# Patient Record
Sex: FEMALE | Race: BLACK OR AFRICAN AMERICAN | NOT HISPANIC OR LATINO | URBAN - METROPOLITAN AREA
[De-identification: names, ages, dates, MRNs, and addresses within clinical notes are randomized per-mention and may not be internally consistent; named-entity substitution may affect disease eponyms.]

---

## 2018-11-28 ENCOUNTER — INPATIENT (INPATIENT)
Facility: HOSPITAL | Age: 58
LOS: 5 days | Discharge: HOME | End: 2018-12-04
Attending: HOSPITALIST | Admitting: HOSPITALIST
Payer: MEDICAID

## 2018-11-28 VITALS
TEMPERATURE: 98 F | OXYGEN SATURATION: 99 % | RESPIRATION RATE: 18 BRPM | HEART RATE: 75 BPM | SYSTOLIC BLOOD PRESSURE: 135 MMHG | DIASTOLIC BLOOD PRESSURE: 93 MMHG

## 2018-11-28 DIAGNOSIS — Z98.890 OTHER SPECIFIED POSTPROCEDURAL STATES: Chronic | ICD-10-CM

## 2018-11-28 DIAGNOSIS — D21.9 BENIGN NEOPLASM OF CONNECTIVE AND OTHER SOFT TISSUE, UNSPECIFIED: Chronic | ICD-10-CM

## 2018-11-28 LAB
ALBUMIN SERPL ELPH-MCNC: 4.1 G/DL — SIGNIFICANT CHANGE UP (ref 3.3–5)
ALP SERPL-CCNC: 78 U/L — SIGNIFICANT CHANGE UP (ref 40–120)
ALT FLD-CCNC: 6 U/L — SIGNIFICANT CHANGE UP (ref 4–33)
APPEARANCE UR: CLEAR — SIGNIFICANT CHANGE UP
AST SERPL-CCNC: 14 U/L — SIGNIFICANT CHANGE UP (ref 4–32)
BACTERIA # UR AUTO: NEGATIVE — SIGNIFICANT CHANGE UP
BASE EXCESS BLDV CALC-SCNC: 4.7 MMOL/L — SIGNIFICANT CHANGE UP
BILIRUB SERPL-MCNC: 0.4 MG/DL — SIGNIFICANT CHANGE UP (ref 0.2–1.2)
BILIRUB UR-MCNC: NEGATIVE — SIGNIFICANT CHANGE UP
BLOOD GAS VENOUS - CREATININE: 0.82 MG/DL — SIGNIFICANT CHANGE UP (ref 0.5–1.3)
BLOOD UR QL VISUAL: NEGATIVE — SIGNIFICANT CHANGE UP
BUN SERPL-MCNC: 13 MG/DL — SIGNIFICANT CHANGE UP (ref 7–23)
CALCIUM SERPL-MCNC: 10.1 MG/DL — SIGNIFICANT CHANGE UP (ref 8.4–10.5)
CHLORIDE BLDV-SCNC: 106 MMOL/L — SIGNIFICANT CHANGE UP (ref 96–108)
CHLORIDE SERPL-SCNC: 101 MMOL/L — SIGNIFICANT CHANGE UP (ref 98–107)
CO2 SERPL-SCNC: 25 MMOL/L — SIGNIFICANT CHANGE UP (ref 22–31)
COLOR SPEC: YELLOW — SIGNIFICANT CHANGE UP
CREAT SERPL-MCNC: 0.99 MG/DL — SIGNIFICANT CHANGE UP (ref 0.5–1.3)
GAS PNL BLDV: 140 MMOL/L — SIGNIFICANT CHANGE UP (ref 136–146)
GLUCOSE BLDV-MCNC: 97 — SIGNIFICANT CHANGE UP (ref 70–99)
GLUCOSE SERPL-MCNC: 103 MG/DL — HIGH (ref 70–99)
GLUCOSE UR-MCNC: NEGATIVE — SIGNIFICANT CHANGE UP
HCO3 BLDV-SCNC: 27 MMOL/L — SIGNIFICANT CHANGE UP (ref 20–27)
HCT VFR BLD CALC: 31.9 % — LOW (ref 34.5–45)
HCT VFR BLDV CALC: 31.2 % — LOW (ref 34.5–45)
HGB BLD-MCNC: 10.6 G/DL — LOW (ref 11.5–15.5)
HGB BLDV-MCNC: 10.1 G/DL — LOW (ref 11.5–15.5)
HYALINE CASTS # UR AUTO: HIGH
KETONES UR-MCNC: SIGNIFICANT CHANGE UP
LACTATE BLDV-MCNC: 1.3 MMOL/L — SIGNIFICANT CHANGE UP (ref 0.5–2)
LEUKOCYTE ESTERASE UR-ACNC: SIGNIFICANT CHANGE UP
LIDOCAIN IGE QN: 28.9 U/L — SIGNIFICANT CHANGE UP (ref 7–60)
MCHC RBC-ENTMCNC: 30.2 PG — SIGNIFICANT CHANGE UP (ref 27–34)
MCHC RBC-ENTMCNC: 33.2 % — SIGNIFICANT CHANGE UP (ref 32–36)
MCV RBC AUTO: 90.9 FL — SIGNIFICANT CHANGE UP (ref 80–100)
NITRITE UR-MCNC: NEGATIVE — SIGNIFICANT CHANGE UP
NRBC # FLD: 0 — SIGNIFICANT CHANGE UP
PCO2 BLDV: 52 MMHG — HIGH (ref 41–51)
PH BLDV: 7.37 PH — SIGNIFICANT CHANGE UP (ref 7.32–7.43)
PH UR: 6 — SIGNIFICANT CHANGE UP (ref 5–8)
PLATELET # BLD AUTO: 484 K/UL — HIGH (ref 150–400)
PMV BLD: 9.3 FL — SIGNIFICANT CHANGE UP (ref 7–13)
PO2 BLDV: 19 MMHG — LOW (ref 35–40)
POTASSIUM BLDV-SCNC: 3.3 MMOL/L — LOW (ref 3.4–4.5)
POTASSIUM SERPL-MCNC: 3.4 MMOL/L — LOW (ref 3.5–5.3)
POTASSIUM SERPL-SCNC: 3.4 MMOL/L — LOW (ref 3.5–5.3)
PROT SERPL-MCNC: 7.6 G/DL — SIGNIFICANT CHANGE UP (ref 6–8.3)
PROT UR-MCNC: 70 — SIGNIFICANT CHANGE UP
RBC # BLD: 3.51 M/UL — LOW (ref 3.8–5.2)
RBC # FLD: 13 % — SIGNIFICANT CHANGE UP (ref 10.3–14.5)
RBC CASTS # UR COMP ASSIST: HIGH (ref 0–?)
SAO2 % BLDV: 22.2 % — LOW (ref 60–85)
SODIUM SERPL-SCNC: 142 MMOL/L — SIGNIFICANT CHANGE UP (ref 135–145)
SP GR SPEC: 1.04 — SIGNIFICANT CHANGE UP (ref 1–1.04)
SQUAMOUS # UR AUTO: SIGNIFICANT CHANGE UP
UROBILINOGEN FLD QL: NORMAL — SIGNIFICANT CHANGE UP
WBC # BLD: 12.67 K/UL — HIGH (ref 3.8–10.5)
WBC # FLD AUTO: 12.67 K/UL — HIGH (ref 3.8–10.5)
WBC UR QL: HIGH (ref 0–?)

## 2018-11-28 PROCEDURE — 74177 CT ABD & PELVIS W/CONTRAST: CPT | Mod: 26

## 2018-11-28 RX ORDER — SODIUM CHLORIDE 9 MG/ML
1000 INJECTION INTRAMUSCULAR; INTRAVENOUS; SUBCUTANEOUS ONCE
Qty: 0 | Refills: 0 | Status: COMPLETED | OUTPATIENT
Start: 2018-11-28 | End: 2018-11-28

## 2018-11-28 RX ORDER — CIPROFLOXACIN LACTATE 400MG/40ML
400 VIAL (ML) INTRAVENOUS ONCE
Qty: 0 | Refills: 0 | Status: COMPLETED | OUTPATIENT
Start: 2018-11-28 | End: 2018-11-28

## 2018-11-28 RX ORDER — MORPHINE SULFATE 50 MG/1
2 CAPSULE, EXTENDED RELEASE ORAL ONCE
Qty: 0 | Refills: 0 | Status: DISCONTINUED | OUTPATIENT
Start: 2018-11-28 | End: 2018-11-28

## 2018-11-28 RX ORDER — ONDANSETRON 8 MG/1
4 TABLET, FILM COATED ORAL ONCE
Qty: 0 | Refills: 0 | Status: COMPLETED | OUTPATIENT
Start: 2018-11-28 | End: 2018-11-28

## 2018-11-28 RX ADMIN — MORPHINE SULFATE 2 MILLIGRAM(S): 50 CAPSULE, EXTENDED RELEASE ORAL at 23:25

## 2018-11-28 RX ADMIN — ONDANSETRON 4 MILLIGRAM(S): 8 TABLET, FILM COATED ORAL at 17:39

## 2018-11-28 RX ADMIN — SODIUM CHLORIDE 2000 MILLILITER(S): 9 INJECTION INTRAMUSCULAR; INTRAVENOUS; SUBCUTANEOUS at 17:39

## 2018-11-28 RX ADMIN — Medication 200 MILLIGRAM(S): at 23:42

## 2018-11-28 RX ADMIN — MORPHINE SULFATE 2 MILLIGRAM(S): 50 CAPSULE, EXTENDED RELEASE ORAL at 18:49

## 2018-11-28 RX ADMIN — MORPHINE SULFATE 2 MILLIGRAM(S): 50 CAPSULE, EXTENDED RELEASE ORAL at 17:38

## 2018-11-28 NOTE — ED PROVIDER NOTE - OBJECTIVE STATEMENT
C/o lower abd pains x ~4 months.  Patient is a difficult historian, very anxious, undressing in front of me.  Reports pains throughout her entire body since ~7/2018 when she was seen at [facility] and told she had a UTI for which she was prescribed bactrim.  Patient reports her sx never resolved.  Her predominant pain is in the lower abdomen, although she describes it as varying in location and worsening her chronic chest pains which she has daily.  Patient reports having vaginal bleeding intermittently over past month.  Previous to this had not had menstruation since age 48.  She also reports inability to tolerate PO secondary to vomiting over the past two weeks.  No diarrhea, no constipation.  +dysuria ("have to push it out").  No fevers/chills.  Patent reports only exacerbating factor is eating, and does have a h/o "gastritis".  Also reports itchy rash to right breast and left flank ~4 days ago.

## 2018-11-28 NOTE — ED ADULT TRIAGE NOTE - CHIEF COMPLAINT QUOTE
Patient has c/o abdominal and chest pain. Pt states she is unable to keep anything down. Pt brings papers from ultrasound and MD wants a repeat ultrasound

## 2018-11-28 NOTE — ED PROVIDER NOTE - MEDICAL DECISION MAKING DETAILS
multiple complaints, but predominant concern is chronic abd pain which appears to localize to right abdomen.  had recent negative sono from 2 weeks ago, which her PMD wants to repeat due to inability to visualize GB.  however, given how generalized the pain is, will check CT instead, as well as labs including CBC, comprehensive, lipase.  pain control, IVFs, nausea control, re-assess

## 2018-11-28 NOTE — ED PROVIDER NOTE - PMH
CAD (coronary artery disease)    CHF (congestive heart failure)    Fibroids    Gastritis    Hypertension    Pacemaker

## 2018-11-28 NOTE — ED PROVIDER NOTE - SKIN, MLM
Skin normal color for race, warm, dry and intact. rigth breast rash (escorted by PCA Anahi Yen): mild ?fungal rash at 12 o'clock ~ 1cm from nipple, no warmth/swelling.  left hip: similar appearing rash ~2X3 cm

## 2018-11-28 NOTE — ED ADULT NURSE REASSESSMENT NOTE - NS ED NURSE REASSESS COMMENT FT1
Patient awake  and alert, c/o abdominal pain and nausea. IV placed, labs sent, urine sent. NS infusing as ordered, medicated as ordered. Drinking Gastroview for abd,. ct prep.

## 2018-11-28 NOTE — ED PROVIDER NOTE - PROGRESS NOTE DETAILS
Rafael att: This patient has multiple complains with the predominant ones being the generalized abdominal pain (R>L) and the persistent urinary symptoms which seem to be unrelieved by antibiotics which she has been previously treated with bactrim. Findings of CT abdomen/pelvis: R mild hydronephrosis with a possible UTI leading to the possibility of an infected ureteral stone which may not have been visualized due to the CT being performed with IV contrast. Cipro was given and urology was consulted who will follow the pt as an inpatient.

## 2018-11-29 DIAGNOSIS — D47.3 ESSENTIAL (HEMORRHAGIC) THROMBOCYTHEMIA: ICD-10-CM

## 2018-11-29 DIAGNOSIS — N39.0 URINARY TRACT INFECTION, SITE NOT SPECIFIED: ICD-10-CM

## 2018-11-29 DIAGNOSIS — Z29.9 ENCOUNTER FOR PROPHYLACTIC MEASURES, UNSPECIFIED: ICD-10-CM

## 2018-11-29 DIAGNOSIS — B17.9 ACUTE VIRAL HEPATITIS, UNSPECIFIED: ICD-10-CM

## 2018-11-29 DIAGNOSIS — N13.30 UNSPECIFIED HYDRONEPHROSIS: ICD-10-CM

## 2018-11-29 DIAGNOSIS — I25.10 ATHEROSCLEROTIC HEART DISEASE OF NATIVE CORONARY ARTERY WITHOUT ANGINA PECTORIS: ICD-10-CM

## 2018-11-29 DIAGNOSIS — I50.9 HEART FAILURE, UNSPECIFIED: ICD-10-CM

## 2018-11-29 DIAGNOSIS — E87.6 HYPOKALEMIA: ICD-10-CM

## 2018-11-29 DIAGNOSIS — D64.9 ANEMIA, UNSPECIFIED: ICD-10-CM

## 2018-11-29 DIAGNOSIS — R10.9 UNSPECIFIED ABDOMINAL PAIN: ICD-10-CM

## 2018-11-29 DIAGNOSIS — T39.1X4A POISONING BY 4-AMINOPHENOL DERIVATIVES, UNDETERMINED, INITIAL ENCOUNTER: ICD-10-CM

## 2018-11-29 DIAGNOSIS — Z98.890 OTHER SPECIFIED POSTPROCEDURAL STATES: Chronic | ICD-10-CM

## 2018-11-29 DIAGNOSIS — R42 DIZZINESS AND GIDDINESS: ICD-10-CM

## 2018-11-29 LAB
ALBUMIN SERPL ELPH-MCNC: 3.5 G/DL — SIGNIFICANT CHANGE UP (ref 3.3–5)
ALBUMIN SERPL ELPH-MCNC: 3.6 G/DL — SIGNIFICANT CHANGE UP (ref 3.3–5)
ALP SERPL-CCNC: 169 U/L — HIGH (ref 40–120)
ALP SERPL-CCNC: 176 U/L — HIGH (ref 40–120)
ALT FLD-CCNC: 450 U/L — HIGH (ref 4–33)
ALT FLD-CCNC: 451 U/L — HIGH (ref 4–33)
APAP SERPL-MCNC: < 15 UG/ML — LOW (ref 15–25)
APTT BLD: 30.1 SEC — SIGNIFICANT CHANGE UP (ref 27.5–36.3)
AST SERPL-CCNC: 611 U/L — HIGH (ref 4–32)
AST SERPL-CCNC: 790 U/L — HIGH (ref 4–32)
BASOPHILS # BLD AUTO: 0.04 K/UL — SIGNIFICANT CHANGE UP (ref 0–0.2)
BASOPHILS NFR BLD AUTO: 0.4 % — SIGNIFICANT CHANGE UP (ref 0–2)
BILIRUB SERPL-MCNC: 0.4 MG/DL — SIGNIFICANT CHANGE UP (ref 0.2–1.2)
BILIRUB SERPL-MCNC: 0.7 MG/DL — SIGNIFICANT CHANGE UP (ref 0.2–1.2)
BUN SERPL-MCNC: 9 MG/DL — SIGNIFICANT CHANGE UP (ref 7–23)
BUN SERPL-MCNC: 9 MG/DL — SIGNIFICANT CHANGE UP (ref 7–23)
CALCIUM SERPL-MCNC: 9.3 MG/DL — SIGNIFICANT CHANGE UP (ref 8.4–10.5)
CALCIUM SERPL-MCNC: 9.4 MG/DL — SIGNIFICANT CHANGE UP (ref 8.4–10.5)
CHLORIDE SERPL-SCNC: 103 MMOL/L — SIGNIFICANT CHANGE UP (ref 98–107)
CHLORIDE SERPL-SCNC: 104 MMOL/L — SIGNIFICANT CHANGE UP (ref 98–107)
CO2 SERPL-SCNC: 27 MMOL/L — SIGNIFICANT CHANGE UP (ref 22–31)
CO2 SERPL-SCNC: 27 MMOL/L — SIGNIFICANT CHANGE UP (ref 22–31)
CREAT SERPL-MCNC: 0.86 MG/DL — SIGNIFICANT CHANGE UP (ref 0.5–1.3)
CREAT SERPL-MCNC: 0.87 MG/DL — SIGNIFICANT CHANGE UP (ref 0.5–1.3)
EOSINOPHIL # BLD AUTO: 0.35 K/UL — SIGNIFICANT CHANGE UP (ref 0–0.5)
EOSINOPHIL NFR BLD AUTO: 3.4 % — SIGNIFICANT CHANGE UP (ref 0–6)
ETHANOL BLD-MCNC: < 10 MG/DL — SIGNIFICANT CHANGE UP
GLUCOSE SERPL-MCNC: 107 MG/DL — HIGH (ref 70–99)
GLUCOSE SERPL-MCNC: 108 MG/DL — HIGH (ref 70–99)
HCT VFR BLD CALC: 29.3 % — LOW (ref 34.5–45)
HGB BLD-MCNC: 9.7 G/DL — LOW (ref 11.5–15.5)
IMM GRANULOCYTES # BLD AUTO: 0.04 # — SIGNIFICANT CHANGE UP
IMM GRANULOCYTES NFR BLD AUTO: 0.4 % — SIGNIFICANT CHANGE UP (ref 0–1.5)
INR BLD: 1.1 — SIGNIFICANT CHANGE UP (ref 0.88–1.17)
LYMPHOCYTES # BLD AUTO: 1.39 K/UL — SIGNIFICANT CHANGE UP (ref 1–3.3)
LYMPHOCYTES # BLD AUTO: 13.4 % — SIGNIFICANT CHANGE UP (ref 13–44)
MAGNESIUM SERPL-MCNC: 1.9 MG/DL — SIGNIFICANT CHANGE UP (ref 1.6–2.6)
MCHC RBC-ENTMCNC: 30.4 PG — SIGNIFICANT CHANGE UP (ref 27–34)
MCHC RBC-ENTMCNC: 33.1 % — SIGNIFICANT CHANGE UP (ref 32–36)
MCV RBC AUTO: 91.8 FL — SIGNIFICANT CHANGE UP (ref 80–100)
MONOCYTES # BLD AUTO: 0.83 K/UL — SIGNIFICANT CHANGE UP (ref 0–0.9)
MONOCYTES NFR BLD AUTO: 8 % — SIGNIFICANT CHANGE UP (ref 2–14)
NEUTROPHILS # BLD AUTO: 7.75 K/UL — HIGH (ref 1.8–7.4)
NEUTROPHILS NFR BLD AUTO: 74.4 % — SIGNIFICANT CHANGE UP (ref 43–77)
NRBC # FLD: 0 — SIGNIFICANT CHANGE UP
PHOSPHATE SERPL-MCNC: 3.4 MG/DL — SIGNIFICANT CHANGE UP (ref 2.5–4.5)
PLATELET # BLD AUTO: 406 K/UL — HIGH (ref 150–400)
PMV BLD: 9.2 FL — SIGNIFICANT CHANGE UP (ref 7–13)
POTASSIUM SERPL-MCNC: 3.1 MMOL/L — LOW (ref 3.5–5.3)
POTASSIUM SERPL-MCNC: 3.4 MMOL/L — LOW (ref 3.5–5.3)
POTASSIUM SERPL-SCNC: 3.1 MMOL/L — LOW (ref 3.5–5.3)
POTASSIUM SERPL-SCNC: 3.4 MMOL/L — LOW (ref 3.5–5.3)
PROT SERPL-MCNC: 6.6 G/DL — SIGNIFICANT CHANGE UP (ref 6–8.3)
PROT SERPL-MCNC: 6.6 G/DL — SIGNIFICANT CHANGE UP (ref 6–8.3)
PROTHROM AB SERPL-ACNC: 12.3 SEC — SIGNIFICANT CHANGE UP (ref 9.8–13.1)
RBC # BLD: 3.19 M/UL — LOW (ref 3.8–5.2)
RBC # FLD: 12.8 % — SIGNIFICANT CHANGE UP (ref 10.3–14.5)
SALICYLATES SERPL-MCNC: < 5 MG/DL — LOW (ref 15–30)
SODIUM SERPL-SCNC: 141 MMOL/L — SIGNIFICANT CHANGE UP (ref 135–145)
SODIUM SERPL-SCNC: 142 MMOL/L — SIGNIFICANT CHANGE UP (ref 135–145)
WBC # BLD: 10.4 K/UL — SIGNIFICANT CHANGE UP (ref 3.8–10.5)
WBC # FLD AUTO: 10.4 K/UL — SIGNIFICANT CHANGE UP (ref 3.8–10.5)

## 2018-11-29 PROCEDURE — 99223 1ST HOSP IP/OBS HIGH 75: CPT

## 2018-11-29 PROCEDURE — 76700 US EXAM ABDOM COMPLETE: CPT | Mod: 26

## 2018-11-29 PROCEDURE — 93281 PM DEVICE PROGR EVAL MULTI: CPT | Mod: 26

## 2018-11-29 PROCEDURE — 12345: CPT | Mod: NC,GC

## 2018-11-29 RX ORDER — CARVEDILOL PHOSPHATE 80 MG/1
1 CAPSULE, EXTENDED RELEASE ORAL
Qty: 0 | Refills: 0 | COMMUNITY

## 2018-11-29 RX ORDER — SACUBITRIL AND VALSARTAN 24; 26 MG/1; MG/1
1 TABLET, FILM COATED ORAL
Qty: 0 | Refills: 0 | COMMUNITY

## 2018-11-29 RX ORDER — MORPHINE SULFATE 50 MG/1
2 CAPSULE, EXTENDED RELEASE ORAL EVERY 4 HOURS
Qty: 0 | Refills: 0 | Status: DISCONTINUED | OUTPATIENT
Start: 2018-11-29 | End: 2018-12-03

## 2018-11-29 RX ORDER — SIMETHICONE 80 MG/1
80 TABLET, CHEWABLE ORAL ONCE
Qty: 0 | Refills: 0 | Status: COMPLETED | OUTPATIENT
Start: 2018-11-29 | End: 2018-11-29

## 2018-11-29 RX ORDER — SIMETHICONE 80 MG/1
80 TABLET, CHEWABLE ORAL EVERY 6 HOURS
Qty: 0 | Refills: 0 | Status: DISCONTINUED | OUTPATIENT
Start: 2018-11-29 | End: 2018-12-04

## 2018-11-29 RX ORDER — MORPHINE SULFATE 50 MG/1
2 CAPSULE, EXTENDED RELEASE ORAL ONCE
Qty: 0 | Refills: 0 | Status: DISCONTINUED | OUTPATIENT
Start: 2018-11-29 | End: 2018-11-29

## 2018-11-29 RX ORDER — CIPROFLOXACIN LACTATE 400MG/40ML
400 VIAL (ML) INTRAVENOUS EVERY 12 HOURS
Qty: 0 | Refills: 0 | Status: DISCONTINUED | OUTPATIENT
Start: 2018-11-29 | End: 2018-11-29

## 2018-11-29 RX ORDER — ASPIRIN/CALCIUM CARB/MAGNESIUM 324 MG
1 TABLET ORAL
Qty: 0 | Refills: 0 | COMMUNITY

## 2018-11-29 RX ORDER — SENNA PLUS 8.6 MG/1
2 TABLET ORAL AT BEDTIME
Qty: 0 | Refills: 0 | Status: DISCONTINUED | OUTPATIENT
Start: 2018-11-29 | End: 2018-12-04

## 2018-11-29 RX ORDER — ACETYLCYSTEINE 200 MG/ML
3.4 VIAL (ML) MISCELLANEOUS ONCE
Qty: 0 | Refills: 0 | Status: COMPLETED | OUTPATIENT
Start: 2018-11-29 | End: 2018-11-29

## 2018-11-29 RX ORDER — POTASSIUM CHLORIDE 20 MEQ
20 PACKET (EA) ORAL
Qty: 0 | Refills: 0 | Status: COMPLETED | OUTPATIENT
Start: 2018-11-29 | End: 2018-11-29

## 2018-11-29 RX ORDER — SODIUM CHLORIDE 9 MG/ML
500 INJECTION, SOLUTION INTRAVENOUS
Qty: 0 | Refills: 0 | Status: DISCONTINUED | OUTPATIENT
Start: 2018-11-29 | End: 2018-11-29

## 2018-11-29 RX ORDER — SACUBITRIL AND VALSARTAN 24; 26 MG/1; MG/1
1 TABLET, FILM COATED ORAL
Qty: 0 | Refills: 0 | Status: DISCONTINUED | OUTPATIENT
Start: 2018-11-29 | End: 2018-12-04

## 2018-11-29 RX ORDER — QUETIAPINE FUMARATE 200 MG/1
50 TABLET, FILM COATED ORAL AT BEDTIME
Qty: 0 | Refills: 0 | Status: DISCONTINUED | OUTPATIENT
Start: 2018-11-29 | End: 2018-12-04

## 2018-11-29 RX ORDER — QUETIAPINE FUMARATE 200 MG/1
50 TABLET, FILM COATED ORAL DAILY
Qty: 0 | Refills: 0 | Status: DISCONTINUED | OUTPATIENT
Start: 2018-11-29 | End: 2018-12-04

## 2018-11-29 RX ORDER — POTASSIUM CHLORIDE 20 MEQ
10 PACKET (EA) ORAL
Qty: 0 | Refills: 0 | Status: DISCONTINUED | OUTPATIENT
Start: 2018-11-29 | End: 2018-11-29

## 2018-11-29 RX ORDER — CARVEDILOL PHOSPHATE 80 MG/1
25 CAPSULE, EXTENDED RELEASE ORAL EVERY 12 HOURS
Qty: 0 | Refills: 0 | Status: DISCONTINUED | OUTPATIENT
Start: 2018-11-29 | End: 2018-12-04

## 2018-11-29 RX ORDER — ACETYLCYSTEINE 200 MG/ML
7 VIAL (ML) MISCELLANEOUS ONCE
Qty: 0 | Refills: 0 | Status: COMPLETED | OUTPATIENT
Start: 2018-11-29 | End: 2018-11-29

## 2018-11-29 RX ORDER — MORPHINE SULFATE 50 MG/1
4 CAPSULE, EXTENDED RELEASE ORAL EVERY 4 HOURS
Qty: 0 | Refills: 0 | Status: DISCONTINUED | OUTPATIENT
Start: 2018-11-29 | End: 2018-12-03

## 2018-11-29 RX ORDER — DOCUSATE SODIUM 100 MG
100 CAPSULE ORAL THREE TIMES A DAY
Qty: 0 | Refills: 0 | Status: DISCONTINUED | OUTPATIENT
Start: 2018-11-29 | End: 2018-12-04

## 2018-11-29 RX ORDER — INFLUENZA VIRUS VACCINE 15; 15; 15; 15 UG/.5ML; UG/.5ML; UG/.5ML; UG/.5ML
0.5 SUSPENSION INTRAMUSCULAR ONCE
Qty: 0 | Refills: 0 | Status: DISCONTINUED | OUTPATIENT
Start: 2018-11-29 | End: 2018-12-04

## 2018-11-29 RX ORDER — SODIUM CHLORIDE 9 MG/ML
1000 INJECTION, SOLUTION INTRAVENOUS
Qty: 0 | Refills: 0 | Status: DISCONTINUED | OUTPATIENT
Start: 2018-11-29 | End: 2018-12-04

## 2018-11-29 RX ORDER — ACETYLCYSTEINE 200 MG/ML
10 VIAL (ML) MISCELLANEOUS ONCE
Qty: 0 | Refills: 0 | Status: COMPLETED | OUTPATIENT
Start: 2018-11-29 | End: 2018-11-29

## 2018-11-29 RX ORDER — ONDANSETRON 8 MG/1
4 TABLET, FILM COATED ORAL EVERY 8 HOURS
Qty: 0 | Refills: 0 | Status: DISCONTINUED | OUTPATIENT
Start: 2018-11-29 | End: 2018-12-04

## 2018-11-29 RX ORDER — ASPIRIN/CALCIUM CARB/MAGNESIUM 324 MG
81 TABLET ORAL DAILY
Qty: 0 | Refills: 0 | Status: DISCONTINUED | OUTPATIENT
Start: 2018-11-29 | End: 2018-12-04

## 2018-11-29 RX ORDER — QUETIAPINE FUMARATE 200 MG/1
1 TABLET, FILM COATED ORAL
Qty: 0 | Refills: 0 | COMMUNITY

## 2018-11-29 RX ADMIN — QUETIAPINE FUMARATE 50 MILLIGRAM(S): 200 TABLET, FILM COATED ORAL at 12:09

## 2018-11-29 RX ADMIN — MORPHINE SULFATE 4 MILLIGRAM(S): 50 CAPSULE, EXTENDED RELEASE ORAL at 05:55

## 2018-11-29 RX ADMIN — MORPHINE SULFATE 4 MILLIGRAM(S): 50 CAPSULE, EXTENDED RELEASE ORAL at 05:38

## 2018-11-29 RX ADMIN — Medication 20 MILLIEQUIVALENT(S): at 12:09

## 2018-11-29 RX ADMIN — SIMETHICONE 80 MILLIGRAM(S): 80 TABLET, CHEWABLE ORAL at 03:51

## 2018-11-29 RX ADMIN — SACUBITRIL AND VALSARTAN 1 TABLET(S): 24; 26 TABLET, FILM COATED ORAL at 17:57

## 2018-11-29 RX ADMIN — QUETIAPINE FUMARATE 50 MILLIGRAM(S): 200 TABLET, FILM COATED ORAL at 22:36

## 2018-11-29 RX ADMIN — MORPHINE SULFATE 4 MILLIGRAM(S): 50 CAPSULE, EXTENDED RELEASE ORAL at 22:45

## 2018-11-29 RX ADMIN — Medication 100 MILLIGRAM(S): at 13:50

## 2018-11-29 RX ADMIN — MORPHINE SULFATE 4 MILLIGRAM(S): 50 CAPSULE, EXTENDED RELEASE ORAL at 19:05

## 2018-11-29 RX ADMIN — Medication 100 MILLIEQUIVALENT(S): at 08:27

## 2018-11-29 RX ADMIN — Medication 100 MILLIGRAM(S): at 23:53

## 2018-11-29 RX ADMIN — Medication 20 MILLIEQUIVALENT(S): at 09:11

## 2018-11-29 RX ADMIN — SODIUM CHLORIDE 50 MILLILITER(S): 9 INJECTION, SOLUTION INTRAVENOUS at 14:44

## 2018-11-29 RX ADMIN — MORPHINE SULFATE 4 MILLIGRAM(S): 50 CAPSULE, EXTENDED RELEASE ORAL at 22:30

## 2018-11-29 RX ADMIN — MORPHINE SULFATE 2 MILLIGRAM(S): 50 CAPSULE, EXTENDED RELEASE ORAL at 04:05

## 2018-11-29 RX ADMIN — CARVEDILOL PHOSPHATE 25 MILLIGRAM(S): 80 CAPSULE, EXTENDED RELEASE ORAL at 06:30

## 2018-11-29 RX ADMIN — SACUBITRIL AND VALSARTAN 1 TABLET(S): 24; 26 TABLET, FILM COATED ORAL at 06:30

## 2018-11-29 RX ADMIN — Medication 129.25 GRAM(S): at 13:50

## 2018-11-29 RX ADMIN — Medication 300 GRAM(S): at 12:09

## 2018-11-29 RX ADMIN — Medication 64.69 GRAM(S): at 18:54

## 2018-11-29 RX ADMIN — MORPHINE SULFATE 4 MILLIGRAM(S): 50 CAPSULE, EXTENDED RELEASE ORAL at 18:35

## 2018-11-29 RX ADMIN — SIMETHICONE 80 MILLIGRAM(S): 80 TABLET, CHEWABLE ORAL at 06:30

## 2018-11-29 RX ADMIN — Medication 81 MILLIGRAM(S): at 12:09

## 2018-11-29 RX ADMIN — ONDANSETRON 4 MILLIGRAM(S): 8 TABLET, FILM COATED ORAL at 05:38

## 2018-11-29 RX ADMIN — CARVEDILOL PHOSPHATE 25 MILLIGRAM(S): 80 CAPSULE, EXTENDED RELEASE ORAL at 17:57

## 2018-11-29 RX ADMIN — MORPHINE SULFATE 2 MILLIGRAM(S): 50 CAPSULE, EXTENDED RELEASE ORAL at 03:50

## 2018-11-29 RX ADMIN — SENNA PLUS 2 TABLET(S): 8.6 TABLET ORAL at 22:36

## 2018-11-29 NOTE — PROGRESS NOTE ADULT - PROBLEM SELECTOR PLAN 7
s/p PPM/AICD, per patient due for f/u visit with cardiologist  c/w Entresto, carvedilol, aspirin; not on any diuretics at present  outpatient f/u with cardiologist  Strict I/Os, daily weights call PMD for baseline, cannot seem to pull up H/H from [facility]  will need outpatient f/u with gyn for new DUB  stool regimen given streaking of stool/BRB noted on TP likely secondary to known internal hemorrhoids; routine f/u with outpatient GI  trend H/H Will contact PMD for baseline. Pt reports vaginal spotting w/ intercourse and occasional rectal bleeding with wiping (known internal hemorrhoids) Pt reports she is up to date on pap and colonoscopy  - trend H/H  - bowel regiment  - o/p f/u with Gyn and GI

## 2018-11-29 NOTE — PROGRESS NOTE ADULT - PROBLEM SELECTOR PLAN 3
unclear if related to hydronephrosis noted on CT vs UTI; lipase wnl  CT as above  morphine PRN mod to severe pain (iSTOP Reference #: [id number] negative for Rx)  ondansetron PRN nausea/vomiting  simethicone PRN  needs gyn f/u for reported recently noted DUB (w/intercourse and noted on toilet paper on occasion per patient) appreciate  recs  check PVR bladder scan  f/u UCx Uro c/s given R hydro: no intervention at this time  - abx as above  - PVR bladder scan: 175ml retained  - pending urine Cx

## 2018-11-29 NOTE — H&P ADULT - PROBLEM SELECTOR PLAN 7
s/p PPM/AICD, per patient due for f/u visit with cardiologist  c/w Entresto, carvedilol, aspirin  outpatient f/u with cardiologist  Strict I/Os, daily weights s/p PPM/AICD, per patient due for f/u visit with cardiologist  c/w Entresto, carvedilol, aspirin; not on any diuretics at present  outpatient f/u with cardiologist  Strict I/Os, daily weights

## 2018-11-29 NOTE — PROGRESS NOTE ADULT - SUBJECTIVE AND OBJECTIVE BOX
Authored by Dr Charles Rosas [phone] [facility] / [id number] [facility]    Patient is a 57y old  Female who presents with a chief complaint of abdominal pain (2018 04:25)    SUBJECTIVE / OVERNIGHT EVENTS:  57-year-old female with HTN, CAD, CHF s/p AICD/PPM, gastritis, presenting from home with acute worsening of generalized/R>L sided abdominal pain that radiates to the back.  Patient reports the pain is severe, associated with nausea and vomiting and decreased PO intake.  She reports two recent visits to [facility], where she was diagnosed with a UTI and completed a course of both Bactrim and Levaquin, states her symptoms never completely resolved.  Patient reports hematuria as well as difficulty passing urine, feeling like she needs to force the urine out.  She reports chills but no fevers, weight loss due to decreased PO intake since .  She also incidentally notes vaginal spotting (last menstrual cycle at age 48, no bleeding or spotting since that time, denies new sexual partners or vaginal discharge) and BRBPR on toilet paper associated with hard stool 3 days ago (with noted history of internal hemorrhoids on prior colonoscopy 2 years ago).  She reports dizziness, feeling as if the room is spinning, with a fall in the bathroom a few weeks ago without head trauma however (+)LOC, for which she was seen at [facility].  She reports recurrent left sided chest pain, last took nitroglycerin approximately 1 month ago, no current chest pain.      In the ED VS:  98.4  64-75  131-137/65-93  16-18  %RA, received ciprofloxacin 400mg IV x1, NS 1L IVF, morphine 2mg IV x4, ondansetron 4mg IV x1, simethicone 80mg PO x1    This morning patient reports continued abdominal pain "like a knife is being stabbed into her stomach," worse on the right flank. Pt reports abdominal is intractable and she has been up to 21 extra strength 500mg tylenol daily and similar number of 200mg Aleve for the past week       MEDICATIONS  (STANDING):  aspirin enteric coated 81 milliGRAM(s) Oral daily  carvedilol 25 milliGRAM(s) Oral every 12 hours  ciprofloxacin   IVPB 400 milliGRAM(s) IV Intermittent every 12 hours  docusate sodium 100 milliGRAM(s) Oral three times a day  influenza   Vaccine 0.5 milliLiter(s) IntraMuscular once  potassium chloride    Tablet ER 20 milliEquivalent(s) Oral every 2 hours  QUEtiapine 50 milliGRAM(s) Oral at bedtime  QUEtiapine 50 milliGRAM(s) Oral daily  sacubitril 24 mG/valsartan 26 mG 1 Tablet(s) Oral two times a day  senna 2 Tablet(s) Oral at bedtime    MEDICATIONS  (PRN):  morphine  - Injectable 4 milliGRAM(s) IV Push every 4 hours PRN Severe Pain (7 - 10)  morphine  - Injectable 2 milliGRAM(s) IV Push every 4 hours PRN Moderate Pain (4 - 6)  ondansetron Injectable 4 milliGRAM(s) IV Push every 8 hours PRN Nausea and/or Vomiting  simethicone 80 milliGRAM(s) Chew every 6 hours PRN Gas      Vital Signs Last 24 Hrs  T(C): 37 (2018 03:42), Max: 37 (2018 03:42)  T(F): 98.6 (2018 03:42), Max: 98.6 (2018 03:42)  HR: 74 (2018 05:33) (64 - 75)  BP: 140/86 (2018 05:33) (130/78 - 140/86)  BP(mean): --  RR: 18 (2018 05:33) (16 - 18)  SpO2: 98% (2018 05:33) (98% - 100%)  CAPILLARY BLOOD GLUCOSE        I&O's Summary      PHYSICAL EXAM  GENERAL: In moderate distress secondary to pain, well-developed, well-nourished  HEAD:  Atraumatic, Normocephalic  EYES: EOMI, PERRLA, conjunctiva and sclera clear  NECK: Supple, No JVD  CHEST/LUNG: Clear to auscultation bilaterally; No wheezes, rales or rhonchi  HEART: Regular rate and rhythm; No murmurs, rubs, or gallops, (+)S1, S2  ABDOMEN: Soft, Nondistended; Normal Bowel sounds; (+)TTP werner in RLQ; no CVA TTP b/l  EXTREMITIES:  2+ Peripheral Pulses, No clubbing, cyanosis, or edema  PSYCH: normal mood and affect; denies SI/HI, however reports last time she was at [facility] she reported SI due to severe pain  NEUROLOGY: AAOx3, non-focal  SKIN: No lesions; dry scaly rash on R breast and L inguinal area with some associated hyperpigmentations    LABS:                        9.7    10.40 )-----------( 406      ( 2018 08:30 )             29.3     11-28    142  |  101  |  13  ----------------------------<  103<H>  3.4<L>   |  25  |  0.99    Ca    10.1      2018 17:30    TPro  7.6  /  Alb  4.1  /  TBili  0.4  /  DBili  x   /  AST  14  /  ALT  6   /  AlkPhos  78            Urinalysis Basic - ( 2018 17:30 )    Color: YELLOW / Appearance: CLEAR / S.040 / pH: 6.0  Gluc: NEGATIVE / Ketone: TRACE  / Bili: NEGATIVE / Urobili: NORMAL   Blood: NEGATIVE / Protein: 70 / Nitrite: NEGATIVE   Leuk Esterase: SMALL / RBC: 6-10 / WBC 11-25   Sq Epi: MODERATE / Non Sq Epi: x / Bacteria: NEGATIVE          RADIOLOGY & ADDITIONAL TESTS:    Imaging Personally Reviewed:  Consultant(s) Notes Reviewed:    Care Discussed with Consultants/Other Providers: Authored by Dr Charles Rosas [phone] [facility] / [id number] [facility]    Patient is a 57y old  Female who presents with a chief complaint of abdominal pain (2018 04:25)    SUBJECTIVE / OVERNIGHT EVENTS:  57-year-old female with HTN, CAD, CHF s/p AICD/PPM, gastritis, presenting from home with acute worsening of generalized/R>L sided abdominal pain that radiates to the back.  Patient reports the pain is severe, associated with nausea and vomiting and decreased PO intake.  She reports two recent visits to [facility], where she was diagnosed with a UTI and completed a course of both Bactrim and Levaquin, states her symptoms never completely resolved.  Patient reports hematuria as well as difficulty passing urine, feeling like she needs to force the urine out.  She reports chills but no fevers, weight loss due to decreased PO intake since .  She also incidentally notes vaginal spotting (last menstrual cycle at age 48, no bleeding or spotting since that time, denies new sexual partners or vaginal discharge) and BRBPR on toilet paper associated with hard stool 3 days ago (with noted history of internal hemorrhoids on prior colonoscopy 2 years ago).  She reports dizziness, feeling as if the room is spinning, with a fall in the bathroom a few weeks ago without head trauma however (+)LOC, for which she was seen at [facility].  She reports recurrent left sided chest pain, last took nitroglycerin approximately 1 month ago, no current chest pain.      In the ED VS:  98.4  64-75  131-137/65-93  16-18  %RA, received ciprofloxacin 400mg IV x1, NS 1L IVF, morphine 2mg IV x4, ondansetron 4mg IV x1, simethicone 80mg PO x1    This morning patient reports continued abdominal pain "like a knife is being stabbed into her stomach," worse on the right flank. Pt reports abdominal is intractable and she has been up to 21 extra strength 500mg tylenol daily and similar number of 200mg ibuprofen for the past week.      MEDICATIONS  (STANDING):  aspirin enteric coated 81 milliGRAM(s) Oral daily  carvedilol 25 milliGRAM(s) Oral every 12 hours  ciprofloxacin   IVPB 400 milliGRAM(s) IV Intermittent every 12 hours  docusate sodium 100 milliGRAM(s) Oral three times a day  influenza   Vaccine 0.5 milliLiter(s) IntraMuscular once  potassium chloride    Tablet ER 20 milliEquivalent(s) Oral every 2 hours  QUEtiapine 50 milliGRAM(s) Oral at bedtime  QUEtiapine 50 milliGRAM(s) Oral daily  sacubitril 24 mG/valsartan 26 mG 1 Tablet(s) Oral two times a day  senna 2 Tablet(s) Oral at bedtime    MEDICATIONS  (PRN):  morphine  - Injectable 4 milliGRAM(s) IV Push every 4 hours PRN Severe Pain (7 - 10)  morphine  - Injectable 2 milliGRAM(s) IV Push every 4 hours PRN Moderate Pain (4 - 6)  ondansetron Injectable 4 milliGRAM(s) IV Push every 8 hours PRN Nausea and/or Vomiting  simethicone 80 milliGRAM(s) Chew every 6 hours PRN Gas      Vital Signs Last 24 Hrs  T(C): 37 (2018 03:42), Max: 37 (2018 03:42)  T(F): 98.6 (2018 03:42), Max: 98.6 (2018 03:42)  HR: 74 (2018 05:33) (64 - 75)  BP: 140/86 (2018 05:33) (130/78 - 140/86)  BP(mean): --  RR: 18 (2018 05:33) (16 - 18)  SpO2: 98% (2018 05:33) (98% - 100%)  CAPILLARY BLOOD GLUCOSE        I&O's Summary      PHYSICAL EXAM  GENERAL: In moderate distress secondary to pain, well-developed, well-nourished  HEAD:  Atraumatic, Normocephalic  EYES: EOMI, PERRLA, conjunctiva and sclera clear  NECK: Supple, No JVD  CHEST/LUNG: Clear to auscultation bilaterally; No wheezes, rales or rhonchi  HEART: Regular rate and rhythm; No murmurs, rubs, or gallops, (+)S1, S2  ABDOMEN: Soft, Nondistended; Normal Bowel sounds; (+)TTP werner in RLQ; no CVA TTP b/l  EXTREMITIES:  2+ Peripheral Pulses, No clubbing, cyanosis, or edema  PSYCH: normal mood and affect; denies SI/HI, however reports last time she was at [facility] she reported SI due to severe pain  NEUROLOGY: AAOx3, non-focal  SKIN: No lesions; dry scaly rash on R breast and L inguinal area with some associated hyperpigmentations    LABS:                        9.7    10.40 )-----------( 406      ( 2018 08:30 )             29.3     11-28    142  |  101  |  13  ----------------------------<  103<H>  3.4<L>   |  25  |  0.99    Ca    10.1      2018 17:30    TPro  7.6  /  Alb  4.1  /  TBili  0.4  /  DBili  x   /  AST  14  /  ALT  6   /  AlkPhos  78            Urinalysis Basic - ( 2018 17:30 )    Color: YELLOW / Appearance: CLEAR / S.040 / pH: 6.0  Gluc: NEGATIVE / Ketone: TRACE  / Bili: NEGATIVE / Urobili: NORMAL   Blood: NEGATIVE / Protein: 70 / Nitrite: NEGATIVE   Leuk Esterase: SMALL / RBC: 6-10 / WBC 11-25   Sq Epi: MODERATE / Non Sq Epi: x / Bacteria: NEGATIVE          RADIOLOGY & ADDITIONAL TESTS:    Imaging Personally Reviewed:  Consultant(s) Notes Reviewed:    Care Discussed with Consultants/Other Providers: Authored by Dr Charles Rosas [phone] [facility] / [id number] [facility]    Patient is a 57y old  Female who presents with a chief complaint of abdominal pain (2018 04:25)    SUBJECTIVE / OVERNIGHT EVENTS:  57-year-old female with HTN, CAD, CHF s/p AICD/PPM, gastritis, presenting from home with acute worsening of generalized/R>L sided abdominal pain that radiates to the back.  Patient reports the pain is severe, associated with nausea and vomiting and decreased PO intake.  She reports two recent visits to [facility], where she was diagnosed with a UTI and completed a course of both Bactrim and Levaquin, states her symptoms never completely resolved.  Patient reports hematuria as well as difficulty passing urine, feeling like she needs to force the urine out.  She reports chills but no fevers, weight loss due to decreased PO intake since gi.  She also incidentally notes vaginal spotting (last menstrual cycle at age 48, no bleeding or spotting since that time, denies new sexual partners or vaginal discharge) and BRBPR on toilet paper associated with hard stool 3 days ago (with noted history of internal hemorrhoids on prior colonoscopy 2 years ago).  She reports dizziness, feeling as if the room is spinning, with a fall in the bathroom a few weeks ago without head trauma however (+)LOC, for which she was seen at [facility].  She reports recurrent left sided chest pain, last took nitroglycerin approximately 1 month ago, no current chest pain.      In the ED VS:  98.4  64-75  131-137/65-93  16-18  %RA, received ciprofloxacin 400mg IV x1, NS 1L IVF, morphine 2mg IV x4, ondansetron 4mg IV x1, simethicone 80mg PO x1    This morning patient reports continued abdominal pain "like a knife is being stabbed into her stomach," worse on the right flank. Pt reports abdominal is intractable and she has been up to 21 extra strength 500mg tylenol daily and similar number of 200mg ibuprofen for the past week. At bedside pt has bottle of 500mg Tylenol she purchased ~1w/a only 5 remain of the 100 pill bottle.       MEDICATIONS  (STANDING):  aspirin enteric coated 81 milliGRAM(s) Oral daily  carvedilol 25 milliGRAM(s) Oral every 12 hours  ciprofloxacin   IVPB 400 milliGRAM(s) IV Intermittent every 12 hours  docusate sodium 100 milliGRAM(s) Oral three times a day  influenza   Vaccine 0.5 milliLiter(s) IntraMuscular once  potassium chloride    Tablet ER 20 milliEquivalent(s) Oral every 2 hours  QUEtiapine 50 milliGRAM(s) Oral at bedtime  QUEtiapine 50 milliGRAM(s) Oral daily  sacubitril 24 mG/valsartan 26 mG 1 Tablet(s) Oral two times a day  senna 2 Tablet(s) Oral at bedtime    MEDICATIONS  (PRN):  morphine  - Injectable 4 milliGRAM(s) IV Push every 4 hours PRN Severe Pain (7 - 10)  morphine  - Injectable 2 milliGRAM(s) IV Push every 4 hours PRN Moderate Pain (4 - 6)  ondansetron Injectable 4 milliGRAM(s) IV Push every 8 hours PRN Nausea and/or Vomiting  simethicone 80 milliGRAM(s) Chew every 6 hours PRN Gas      Vital Signs Last 24 Hrs  T(C): 37 (2018 03:42), Max: 37 (2018 03:42)  T(F): 98.6 (2018 03:42), Max: 98.6 (2018 03:42)  HR: 74 (2018 05:33) (64 - 75)  BP: 140/86 (2018 05:33) (130/78 - 140/86)  BP(mean): --  RR: 18 (2018 05:33) (16 - 18)  SpO2: 98% (2018 05:33) (98% - 100%)  CAPILLARY BLOOD GLUCOSE        I&O's Summary      PHYSICAL EXAM  GENERAL: In moderate distress secondary to pain, well-developed, well-nourished  HEAD:  Atraumatic, Normocephalic  EYES: EOMI, PERRLA, conjunctiva and sclera clear  NECK: Supple, No JVD  CHEST/LUNG: Clear to auscultation bilaterally; No wheezes, rales or rhonchi  HEART: Regular rate and rhythm; No murmurs, rubs, or gallops, (+)S1, S2  ABDOMEN: Soft, Nondistended; Normal Bowel sounds; (+)TTP werner in RLQ; no CVA TTP b/l, No rebound, no guarding   EXTREMITIES:  2+ Peripheral Pulses, No clubbing, cyanosis, or edema  PSYCH: normal mood and affect  NEUROLOGY: AAOx3, non-focal  SKIN: No lesions or ecchymosis   LABS:                        9.7    10.40 )-----------( 406      ( 2018 08:30 )             29.3         142  |  101  |  13  ----------------------------<  103<H>  3.4<L>   |  25  |  0.99    Ca    10.1      2018 17:30    TPro  7.6  /  Alb  4.1  /  TBili  0.4  /  DBili  x   /  AST  14  /  ALT  6   /  AlkPhos  78        PT/INR - ( 2018 11:58 )   PT: 12.3 SEC;   INR: 1.10          PTT - ( 2018 11:58 )  PTT:30.1 SEC    Urinalysis Basic - ( 2018 17:30 )    Color: YELLOW / Appearance: CLEAR / S.040 / pH: 6.0  Gluc: NEGATIVE / Ketone: TRACE  / Bili: NEGATIVE / Urobili: NORMAL   Blood: NEGATIVE / Protein: 70 / Nitrite: NEGATIVE   Leuk Esterase: SMALL / RBC: 6-10 / WBC 11-25   Sq Epi: MODERATE / Non Sq Epi: x / Bacteria: NEGATIVE          RADIOLOGY & ADDITIONAL TESTS:    Imaging Personally Reviewed: CTAP  Consultant(s) Notes Reviewed:  Uro  Care Discussed with Consultants/Other Providers: Toxicology

## 2018-11-29 NOTE — H&P ADULT - NSHPREVIEWOFSYSTEMS_GEN_ALL_CORE
REVIEW OF SYSTEMS:    CONSTITUTIONAL: No weakness, fevers (+) chills  EYES/ENT: No visual changes;  No dysphagia  NECK: No pain or stiffness  RESPIRATORY: No cough, wheezing, hemoptysis; No shortness of breath  CARDIOVASCULAR: (+) intermittent chest pain; No palpitations; No lower extremity edema  GASTROINTESTINAL: (+) abdominal pain. (+) nausea, vomiting; No hematemesis; No diarrhea (+) constipation, last BM yesterday. No melena or hematochezia.  GENITOURINARY: No dysuria, (+) hematuria/difficulty passing urine  NEUROLOGICAL: No numbness or weakness; (+)room-spinning dizziness  SKIN: No itching, burning, rashes, or lesions   All other review of systems is negative unless indicated above. REVIEW OF SYSTEMS:    CONSTITUTIONAL: No weakness, fevers (+) chills  EYES/ENT: No visual changes;  No dysphagia  NECK: No pain or stiffness  RESPIRATORY: No cough, wheezing, hemoptysis; No shortness of breath  CARDIOVASCULAR: (+) intermittent chest pain; No palpitations; No lower extremity edema  GASTROINTESTINAL: (+) abdominal pain. (+) nausea, vomiting; No hematemesis; No diarrhea (+) constipation, last BM yesterday. No melena or hematochezia.  GENITOURINARY: No dysuria, (+) hematuria/difficulty passing urine  : (+)vaginal bleeding w/intercourse and noted after toileting; regular menstrual periods ended when patient was 49y/o with no bleeding since that time; no history of abnormal pap smears, last pap ~1 year ago  NEUROLOGICAL: No numbness or weakness; (+)room-spinning dizziness  SKIN: No itching or lesions; R breast scaly patch, reported recent mammogram ~1year ago   All other review of systems is negative unless indicated above.

## 2018-11-29 NOTE — DISCHARGE NOTE ADULT - CARE PROVIDER_API CALL
Osorio Elder (DO), Medicine  [address] [place] [address]  Phone: [phone]  Fax: [phone]    Pablo Melo), Obstetrics and Gynecology  [facility] [place] [address]  Phone: [phone]  Fax: [phone]    Bong Rockwell  [phone]   [facility]   [address] [place] [address]  Phone: (   )    -  Fax: (   )    -

## 2018-11-29 NOTE — PROGRESS NOTE ADULT - SUBJECTIVE AND OBJECTIVE BOX
Subjective:  Patient reports continued abdominal pain. No fevers overnight.    Objectives:  T(C): 36.8 (18 @ 13:05), Max: 37 (18 @ 03:42)  HR: 65 (18 @ 13:05) (65 - 74)  BP: 127/80 (18 @ 13:05) (127/80 - 140/86)  RR: 18 (18 @ 13:05) (18 - 18)  SpO2: 99% (18 @ 13:05) (98% - 99%)  Wt(kg): --      Physcial Exam  GENERAL: NAD, well-developed  PSYCH: AAOx3    LABS:                        9.7    10.40 )-----------( 406      ( 2018 08:30 )             29.3         142  |  104  |  9   ----------------------------<  108<H>  3.4<L>   |  27  |  0.86    Ca    9.4      2018 11:58  Phos  3.4       Mg     1.9         TPro  6.6  /  Alb  3.5  /  TBili  0.4  /  DBili  x   /  AST  611<H>  /  ALT  451<H>  /  AlkPhos  176<H>      CAPILLARY BLOOD GLUCOSE        PT/INR - ( 2018 11:58 )   PT: 12.3 SEC;   INR: 1.10          PTT - ( 2018 11:58 )  PTT:30.1 SEC  CAPILLARY BLOOD GLUCOSE        Urinalysis Basic - ( 2018 17:30 )    Color: YELLOW / Appearance: CLEAR / S.040 / pH: 6.0  Gluc: NEGATIVE / Ketone: TRACE  / Bili: NEGATIVE / Urobili: NORMAL   Blood: NEGATIVE / Protein: 70 / Nitrite: NEGATIVE   Leuk Esterase: SMALL / RBC: 6-10 / WBC 11-25   Sq Epi: MODERATE / Non Sq Epi: x / Bacteria: NEGATIVE

## 2018-11-29 NOTE — H&P ADULT - PMH
CAD (coronary artery disease)    CHF (congestive heart failure)    Fibroids    Gastritis    Hypertension    Pacemaker Asthma    CAD (coronary artery disease)  denies hx of stents  CHF (congestive heart failure)    Fibroids    Gastritis    Hypertension    Pacemaker  /AICD

## 2018-11-29 NOTE — ED ADULT NURSE NOTE - OBJECTIVE STATEMENT
Received pt from day intake RN, pt A&Ox3, respirations even and unlabored b/l. Pt c/o abd pain. IVL 20g Angiocath noted on right AC placed by prior RN. Report given to 902B. Pt awaiting transport.

## 2018-11-29 NOTE — DISCHARGE NOTE ADULT - FINDINGS/TREATMENT
< from: US Transvaginal (12.01.18 @ 16:48) >    IMPRESSION:    Enlarged uterus with mild endometrial thickening.    < end of copied text > < from: Transthoracic Echocardiogram (11.30.18 @ 15:34) >    CONCLUSIONS:  1. Mitral annular calcification, otherwise normal mitral  valve. Minimal mitral regurgitation.  2. Calcified trileaflet aortic valve with normal opening.  3. Endocardium not well visualized; grossly normal left  ventricular systolic function. Septal motion is consistent  with pacing.  4. Normal right ventricular size and function. A device  wire is noted in the right heart.  --------------------------------------------    < end of copied text >    EF 57% CT abdomen and pelvis 11/28/18  LIVER: Subcentimeter hypodense lesions, too small to characterize.  BILE DUCTS: Normal caliber.  GALLBLADDER: Cholecystectomy. Mild central and extrahepatic biliary   ductal dilatation.  SPLEEN: Within normal limits.  PANCREAS: Within normal limits.  ADRENALS: Within normal limits.  KIDNEYS/URETERS: Mild right hydronephrosis and hydroureter with no   radiopaque obstructing entity noted. Left kidney is within normal limits.   Subcentimeter hypodense lesions, too small to characterize.  BLADDER: Within normal limits.  REPRODUCTIVE ORGANS: Uterus and adnexa are within normal limits.  BOWEL: No bowel obstruction. Appendix is normal.  PERITONEUM: No ascites.  VESSELS:  Atherosclerotic disease.  RETROPERITONEUM: No lymphadenopathy.    ABDOMINAL WALL: Within normal limits.  BONES: Within normal limits.

## 2018-11-29 NOTE — H&P ADULT - PSH
Fibroid    H/O ovarian cystectomy    S/P bilateral breast lumpectomy Fibroid    H/O hemorrhoidectomy    H/O ovarian cystectomy    S/P bilateral breast lumpectomy

## 2018-11-29 NOTE — CONSULT NOTE ADULT - SUBJECTIVE AND OBJECTIVE BOX
MEDICAL TOXICOLOGY CONSULT    HPI: 57F PMH HTN, CAD, CHF s/p AICD/PPM, and gastritis presented to ED with 3 month history of progressively worsening abdominal pain.  Patient reports abdominal pain is diffuse and described as sharp.  Patient reports abdominal pain is associated with nausea and vomiting and decreased appetite.  Patient denies fevers, chills, chest pain, dyspnea.  Patient reports hematochezia with BRBPR on toilet paper but denies melena, diarrhea, constipation.  Patient does reports hematuria and dysuria.  Patient reports she has seen a gastroenterologist in the past and was diagnosed with gastritis.    Patient had CT scan and labs in ED which did not identify source of abdominal pain.  Patient was admitted last night.  This morning patient's AST was up from 14 to 790 and ALT is up from 6 to 450.  Patient then reports she has been taking 20 tablets of 500mg Acetaminophen over the past 3 days due to abdominal pain.    ONSET / TIME of exposure(s):  past 3 days    QUANTITY of exposure(s): 20 tablets of 500mg Acetaminophen per day    ROUTE of exposure:  ingestion    CONTEXT of exposure: at home    ASSOCIATED symptoms:    PAST MEDICAL & SURGICAL HISTORY:  Asthma  Pacemaker: /AICD  Gastritis  Fibroids  Hypertension  CAD (coronary artery disease): denies hx of stents  CHF (congestive heart failure)  H/O hemorrhoidectomy  H/O ovarian cystectomy  Fibroid  S/P bilateral breast lumpectomy    MEDICATION HISTORY:  acetylcysteine IVPB 7 Gram(s) IV Intermittent once  aspirin enteric coated 81 milliGRAM(s) Oral daily  carvedilol 25 milliGRAM(s) Oral every 12 hours  ciprofloxacin   IVPB 400 milliGRAM(s) IV Intermittent every 12 hours  docusate sodium 100 milliGRAM(s) Oral three times a day  influenza   Vaccine 0.5 milliLiter(s) IntraMuscular once  morphine  - Injectable 4 milliGRAM(s) IV Push every 4 hours PRN  morphine  - Injectable 2 milliGRAM(s) IV Push every 4 hours PRN  ondansetron Injectable 4 milliGRAM(s) IV Push every 8 hours PRN  QUEtiapine 50 milliGRAM(s) Oral at bedtime  QUEtiapine 50 milliGRAM(s) Oral daily  sacubitril 24 mG/valsartan 26 mG 1 Tablet(s) Oral two times a day  senna 2 Tablet(s) Oral at bedtime  simethicone 80 milliGRAM(s) Chew every 6 hours PRN  sodium chloride 0.45%. 1000 milliLiter(s) IV Continuous <Continuous>      RECREATIONAL / ETHANOL / SUPPLEMENT USE:    SOCIAL Hx:  lives with at home,     FAMILY HISTORY:  Family history of heart disease (Father, Mother, Sibling)      REVIEW OF SYSTEMS:    General:  no fever, chills, malaise, change in weight or fatigue  Eyes:  no blurry vision, double vision, or diminished acuity  ENT:  no tinnitus, decreased acuity, epistaxis, sore throat, dysphagia  Cardiac: (+) chest pain 1 month ago, no syncope, or palpitations  Lung:  no cough, shortness of breath, stridor, or wheezing  GI:  (+) abdominal pain, nausea, vomiting, no diarrhea, or constipation  Genitourinary: (+) dysuria, hematuria, no incontinence  Ortho: no joint pain, swelling, myalgias, atrophy, or spasm  Skin: no rash, lesions, or pruritis  Neuro:  no headache, weakness/numbness, ataxia, change in speech, dizziness, tremor, or seizure  Psych: denies depression, denies anxiety, denies fabian, denies suicidal, denies homicidal  Endocrine: no polydypsia, polyuria, heat/cold intolerance  Hematologic: no bleeding, bruising, petechiae, or adenopathy  Immune:  no rhinitis, atopy, immunocompromise, HIV, or cancer    PHYSICAL EXAM  Vital Signs Last 24 Hrs  T(C): 36.8 (2018 13:05), Max: 37 (2018 03:42)  T(F): 98.3 (2018 13:05), Max: 98.6 (2018 03:42)  HR: 65 (2018 13:05) (64 - 75)  BP: 127/80 (2018 13:05) (127/80 - 140/86)  BP(mean): --  RR: 18 (2018 13:05) (16 - 18)  SpO2: 99% (2018 13:05) (98% - 100%)  General:    Head:  normocephalic & atraumatic  Eyes:  extra-occular movement is intact  Pupils:  3 mm, symmetric, reactive to light  Ear, nose, throat:  mucosa is moist  Neck:  supple  Respiratory:  normal effort, clear to auscultation bilaterally, no rales/ronchi/wheezing  Cardiac:  no rubs/murmurs/gallops  Abdomen:  Soft, nondistended, tender mild diffusely, +bowel sounds, no organomegaly  :  deferred  Skin:  warm and dry  Neurologic:  no Clonus, no Tremor, extremities are supple, cranial nerves II-XII intact  Psychiatric:  alert and oriented x3    SIGNIFICANT LABORATORY STUDIES:                        9.7    10.40 )-----------( 406      ( 2018 08:30 )             29.3           142  |  104  |  9   ----------------------------<  108<H>  3.4<L>   |  27  |  0.86    Ca    9.4      2018 11:58  Phos  3.4       Mg     1.9         TPro  6.6  /  Alb  3.5  /  TBili  0.4  /  DBili  x   /  AST  611<H>  /  ALT  451<H>  /  AlkPhos  176<H>        PT/INR - ( 2018 11:58 )   PT: 12.3 SEC;   INR: 1.10          PTT - ( 2018 11:58 )  PTT:30.1 SEC    Urinalysis Basic - ( 2018 17:30 )    Color: YELLOW / Appearance: CLEAR / S.040 / pH: 6.0  Gluc: NEGATIVE / Ketone: TRACE  / Bili: NEGATIVE / Urobili: NORMAL   Blood: NEGATIVE / Protein: 70 / Nitrite: NEGATIVE   Leuk Esterase: SMALL / RBC: 6-10 / WBC 11-25   Sq Epi: MODERATE / Non Sq Epi: x / Bacteria: NEGATIVE      Aspirin Level: < 5.0<L>   @ 11:58  Acetaminophen Level:  < 15.0<L>   @ 11:58  Ethanol Level:  < 10   @ 11:58    ECG:  Paced at 68bpm, , QTc 442

## 2018-11-29 NOTE — H&P ADULT - PROBLEM SELECTOR PLAN 5
c/w aspirin, unclear why no longer on statin given patient's significant family history c/w aspirin, unclear why no longer on statin given patient's significant family history  clarify w/patient's cardiologist in AM

## 2018-11-29 NOTE — PROGRESS NOTE ADULT - PROBLEM SELECTOR PLAN 1
weakly positive UA, leukocytosis noted no other SIRS criteria present  f/u UCx  c/w ciprofloxacin for now Pt has been taking increasing doses of Tylenol extra strength throughout the week, up to 10.5g yesterday. LFTs normal on presentation, now ,   - toxicology c/s: started on N-acetylcysteinate protocol   - acetaminophen level and salicylate level pending  - hepatitis level pending  - coags pending  - trend LFTs, avoid hepatotoxic medicaitons Pt has been taking increasing doses of Tylenol extra strength throughout the week, up to 10.5g yesterday. LFTs normal on presentation, now ,   - toxicology c/s: started on N-acetylcysteinate protocol   - acetaminophen level and salicylate level pending  - hepatitis panel pending, coags pending  - trend LFTs, avoid hepatotoxic medications

## 2018-11-29 NOTE — DISCHARGE NOTE ADULT - CARE PLAN
Principal Discharge DX:	Acute hepatitis  Goal:	Do not take more than recommended amount of Tylenol  Assessment and plan of treatment:	When you came to the hospital you had severe right sided abdominal pain for which you were taking up to 21 Tylenol extra strength a day. This caused damage to your liver and your required treatment with antidote to Tylenol overdose. If it important that you limit your intake of Tylenol as your liver suffered damage.  Secondary Diagnosis:	Abdominal pain  Goal:	Followup with ____  Assessment and plan of treatment:	You came to the hospital with severe right sided pain related to blockage of urine follow from your right kidney. You were evaluated by urology. Principal Discharge DX:	Acute hepatitis  Goal:	Please avoid taking tylenol  Assessment and plan of treatment:	When you came to the hospital you had severe right sided abdominal pain for which you were taking up to 21 Tylenol extra strength a day. This caused damage to your liver and your required treatment with antidote to Tylenol overdose. If it important that you limit your intake of Tylenol as your liver suffered damage. Please avoid taking tylenol. In the future do not take more than four extra strength tylenol per day as your liver suffered injury from tylenol overdose.  Secondary Diagnosis:	Abdominal pain  Goal:	Followup with Primary doctor  Assessment and plan of treatment:	You came to the hospital with severe right sided pain. You were evaluated by urology and gynecology and no intervention was required. Please followup with your primary doctor Principal Discharge DX:	Acute hepatitis  Goal:	Please avoid taking tylenol  Assessment and plan of treatment:	When you came to the hospital you had severe right sided abdominal pain for which you were taking up to 21 Tylenol extra strength a day. This caused damage to your liver and your required treatment with antidote to Tylenol overdose. If it important that you limit your intake of Tylenol as your liver suffered damage. Please avoid taking tylenol. In the future do not take more than four extra strength tylenol per day as your liver suffered injury from tylenol overdose.  Secondary Diagnosis:	Abdominal pain  Goal:	Followup with Primary doctor  Assessment and plan of treatment:	You came to the hospital with severe right sided pain. You were evaluated by urology and gynecology and no intervention was required. Please followup with your primary doctor. Please take (up to three 200mg pills) no more every six hours. While taking ibuprofen please take an antiacid pill (Protonix) twice a day. Taking too much ibuprofen can cause worsening stomach pain. Your pain was likely related to imbalance between the nerves in your abdomen and your brain. It is important you continue taking your Seroquel and followup with your primary doctor.  Secondary Diagnosis:	Hydronephrosis of right kidney  Goal:	Followup with Urology  Assessment and plan of treatment:	Imaging showed urine backed up around your right kidney, however it was a small amount and no intervention was required. Please followup with Urology outpatient  Secondary Diagnosis:	Vaginal spotting  Goal:	Followup with gynecology  Assessment and plan of treatment:	You reported vaginal spotting and pain. Ultrasound showed enlargement of your uterus, but causes of your severe pain. Please followup with gynecology outpatient. Principal Discharge DX:	Acute hepatitis  Goal:	Please avoid taking Tylenol  Assessment and plan of treatment:	When you came to the hospital you had severe right sided abdominal pain for which you were taking up to 21 Tylenol extra strength a day. This caused damage to your liver and you required treatment with antidote to Tylenol overdose. If it important that you limit your intake of Tylenol as your liver suffered damage. Please avoid taking Tylenol. In the future do not take more than four extra strength Tylenol per day as your liver suffered injury from Tylenol overdose.  Secondary Diagnosis:	Abdominal pain  Goal:	Followup with Primary doctor tomorrow morning at 10:45am  Assessment and plan of treatment:	You came to the hospital with severe right sided pain. You were evaluated by urology and gynecology and no intervention was required. Please followup with your primary doctor. You may take ibuprofen (up to three 200mg pills) no more every six hours. While taking ibuprofen please take an antiacid pill (Protonix) twice a day. Taking too much ibuprofen can cause worsening stomach pain. Your pain was likely related to imbalance between the nerves in your abdomen and your brain. It is important you continue taking your Seroquel and followup with your primary doctor.  Secondary Diagnosis:	Hydronephrosis of right kidney  Goal:	Followup with Urology  Assessment and plan of treatment:	Imaging showed urine backed up around your right kidney, however it was a small amount and no intervention was required. Please followup with Urology outpatient  Secondary Diagnosis:	Vaginal spotting  Goal:	Followup with gynecology  Assessment and plan of treatment:	You reported vaginal spotting and pain. Ultrasound showed enlargement of your uterus, but causes of your severe pain. Please followup with gynecology outpatient. Principal Discharge DX:	Acute hepatitis  Goal:	Please avoid taking Tylenol  Assessment and plan of treatment:	When you came to the hospital you had severe right sided abdominal pain for which you were taking up to 21 Tylenol extra strength a day. This caused damage to your liver and you required treatment with antidote to Tylenol overdose. It is important that you avoid taking Tylenol as your liver suffered damage.  In the future do not take more than four extra strength Tylenol per day as your liver suffered injury from Tylenol overdose. Please followup with your primary doctor to have your liver numbers rechecked  Secondary Diagnosis:	Abdominal pain  Goal:	Followup with Primary doctor tomorrow morning at 10:45am  Assessment and plan of treatment:	You came to the hospital with severe right sided pain. You were evaluated by urology and gynecology and no intervention was required. Please followup with your primary doctor. You may take ibuprofen (up to three 200mg pills) every six hours. While taking ibuprofen please take an antiacid pill (Protonix) twice a day. Taking too much ibuprofen can cause worsening stomach pain. Your pain was likely related to imbalance between the nerves in your abdomen and your brain. It is important you continue taking your Seroquel and followup with your primary doctor.  Secondary Diagnosis:	Hydronephrosis of right kidney  Goal:	Followup with Urology  Assessment and plan of treatment:	Imaging showed urine backed up around your right kidney, however it was a small amount and no intervention was required. Please followup with Urology outpatient  Secondary Diagnosis:	Vaginal spotting  Goal:	Followup with gynecology  Assessment and plan of treatment:	You reported vaginal spotting and pain. Ultrasound showed enlargement of your uterus, but no causes of your severe pain. Please followup with gynecology outpatient.  Secondary Diagnosis:	CHF (congestive heart failure)  Goal:	Followup with your primary doctor  Assessment and plan of treatment:	Please followup with your primary doctor to restart a statin medication for cholesterol control and discuss your heart disease management.

## 2018-11-29 NOTE — CONSULT NOTE ADULT - SUBJECTIVE AND OBJECTIVE BOX
HPI:  This is a 57 year old female with a medical history significant for HTN, CAD, CHF, s/p PPM/AICD, gastritis and fibroids presenting to the ED with a 1 month history of worsening abdominal pain, nausea and vomiting.  She developed lower abdominal pain, and the feeling like she has to use force with voiding in July.  She was seen at [facility] then and was treated with Bactrim for a UTI.   She reports symptoms never completely resolved.  Symptoms have persisted, worsening over the past month.  She was again seen at [facility] on , where an ultrasound was performed on 11/15 showing no hydronephrosis and no stones visualized.  She was discharged on Levaquin.  She reports vaginal bleeding, bleeding with wiping, but no hematuria.  She denies dysuria.  She cannot tell if she is completely emptying her bladder with each void.  Denies increase in frequency.  She reports chills, but no fevers.  She has been taking Tylenol and Motrin at home for pain with no relief.  She cannot tolerate po due to nausea and vomiting, and has not had a solid meal since  ( a week ago).      PAST MEDICAL & SURGICAL HISTORY:  Pacemaker  Gastritis  Fibroids  Hypertension  CAD (coronary artery disease)  CHF (congestive heart failure)  H/O ovarian cystectomy  Fibroid  S/P bilateral breast lumpectomy    MEDICATIONS:  -Tylenol prn  -Motrin prn  -Quetiapine 50mg po QD  -Entresto 24/26mg po BID  -ASA 81mg po QD  -Carvedilol 25mg po BID    Allergies  vancomycin-->oral edema  Zosyn --> oral edema (both vanc and zosyn given at similar times and cannot tell what was the causing agent)    REVIEW OF SYSTEMS: Otherwise negative as stated in HPI    Vital Signs Last 24 Hrs  T(C): 36.6 (2018 23:24), Max: 36.8 (2018 13:44)  T(F): 97.9 (2018 23:24), Max: 98.3 (2018 13:44)  HR: 75 (2018 23:24) (75 - 75)  BP: 137/65 (2018 23:24) (134/81 - 137/65)  BP(mean): --  RR: 18 (2018 23:24) (16 - 18)  SpO2: 100% (2018 23:24) (99% - 100%)    PHYSICAL EXAM:  General: Awake and Alert in no acute distress    Gastrointestinal: distended, but soft, right lower quadrant tenderness     Genitourinary: No CVA tenderness                        	  LABS:                        10.6   12.67 )-----------( 484      ( 2018 17:30 )             31.9     -28    142  |  101  |  13  ----------------------------<  103<H>  3.4<L>   |  25  |  0.99    Ca    10.1      2018 17:30    TPro  7.6  /  Alb  4.1  /  TBili  0.4  /  DBili  x   /  AST  14  /  ALT  6   /  AlkPhos  78        Urinalysis Basic - ( 2018 17:30 )    Color: YELLOW / Appearance: CLEAR / S.040 / pH: 6.0  Gluc: NEGATIVE / Ketone: TRACE  / Bili: NEGATIVE / Urobili: NORMAL   Blood: NEGATIVE / Protein: 70 / Nitrite: NEGATIVE   Leuk Esterase: SMALL / RBC: 6-10 / WBC 11-25   Sq Epi: MODERATE / Non Sq Epi: x / Bacteria: NEGATIVE    RADIOLOGY:  < from: CT Abdomen and Pelvis w/ Oral Cont and w/ IV Cont (18 @ 19:52) >  IMPRESSION:   Mild right hydronephrosis and hydroureter with no radiopaque obstructing   entity noted.    < end of copied text > HPI:  This is a 57 year old female with a medical history significant for HTN, CAD, CHF, s/p PPM/AICD, gastritis and fibroids presenting to the ED with a 1 month history of worsening abdominal pain, nausea and vomiting.  She developed lower abdominal pain, and the feeling like she has to use force with voiding in July.  She was seen at [facility] then and was treated with Bactrim for a UTI.   She reports symptoms never completely resolved.  Symptoms have persisted, worsening over the past month.  She was again seen at [facility] on , where an ultrasound was performed on 11/15 showing no hydronephrosis and no stones visualized.  She was discharged on Levaquin.  She reports vaginal bleeding, bleeding with wiping, but no hematuria.  She denies dysuria.  She cannot tell if she is completely emptying her bladder with each void.  Denies increase in frequency.  She reports chills, but no fevers.  She has been taking Tylenol and Motrin at home for pain with no relief.  She cannot tolerate po due to nausea and vomiting, and has not had a solid meal since  ( a week ago).      PAST MEDICAL & SURGICAL HISTORY:  Pacemaker  Gastritis  Fibroids  Hypertension  CAD (coronary artery disease)  CHF (congestive heart failure)  H/O ovarian cystectomy  Fibroid  S/P bilateral breast lumpectomy    MEDICATIONS:  -Tylenol prn  -Motrin prn  -Quetiapine 50mg po QD  -Entresto 24/26mg po BID  -ASA 81mg po QD  -Carvedilol 25mg po BID    Allergies  vancomycin-->oral edema  Zosyn --> oral edema (both vanc and zosyn given at similar times and cannot tell what was the causing agent)    REVIEW OF SYSTEMS: Otherwise negative as stated in HPI    Vital Signs Last 24 Hrs  T(C): 36.6 (2018 23:24), Max: 36.8 (2018 13:44)  T(F): 97.9 (2018 23:24), Max: 98.3 (2018 13:44)  HR: 75 (2018 23:24) (75 - 75)  BP: 137/65 (2018 23:24) (134/81 - 137/65)  BP(mean): --  RR: 18 (2018 23:24) (16 - 18)  SpO2: 100% (2018 23:24) (99% - 100%)    PHYSICAL EXAM:  General: Awake and Alert in no acute distress    Gastrointestinal: distended, but soft, right lower quadrant tenderness     Genitourinary: No CVA tenderness appreciated, No active vaginal bleeding, no discharge, no discharge or bleeding from meatus, No appreciable prolapse, cystocele and enterocele.                         	  LABS:                        10.6   12.67 )-----------( 484      ( 2018 17:30 )             31.9         142  |  101  |  13  ----------------------------<  103<H>  3.4<L>   |  25  |  0.99    Ca    10.1      2018 17:30    TPro  7.6  /  Alb  4.1  /  TBili  0.4  /  DBili  x   /  AST  14  /  ALT  6   /  AlkPhos  78        Urinalysis Basic - ( 2018 17:30 )    Color: YELLOW / Appearance: CLEAR / S.040 / pH: 6.0  Gluc: NEGATIVE / Ketone: TRACE  / Bili: NEGATIVE / Urobili: NORMAL   Blood: NEGATIVE / Protein: 70 / Nitrite: NEGATIVE   Leuk Esterase: SMALL / RBC: 6-10 / WBC 11-25   Sq Epi: MODERATE / Non Sq Epi: x / Bacteria: NEGATIVE    RADIOLOGY:  < from: CT Abdomen and Pelvis w/ Oral Cont and w/ IV Cont (18 @ 19:52) >  IMPRESSION:   Mild right hydronephrosis and hydroureter with no radiopaque obstructing   entity noted.    < end of copied text >

## 2018-11-29 NOTE — H&P ADULT - PROBLEM SELECTOR PLAN 3
unclear if related to hydronephrosis noted on CT vs UTI; lipase wnl  CT as above  morphine PRN mod to severe pain  ondansetron PRN nausea/vomiting  simethicone PRN  needs gyn f/u for reported recently noted DUB (w/intercourse and noted on toilet paper on occasion per patient) unclear if related to hydronephrosis noted on CT vs UTI; lipase wnl  CT as above  morphine PRN mod to severe pain (iSTOP Reference #: [id number] negative for Rx)  ondansetron PRN nausea/vomiting  simethicone PRN  needs gyn f/u for reported recently noted DUB (w/intercourse and noted on toilet paper on occasion per patient)

## 2018-11-29 NOTE — H&P ADULT - PROBLEM SELECTOR PLAN 4
fall precautions  check orthostatics   given report of fall with LOC a few weeks ago, would benefit from obtaining records from [facility] vs in EP consult for device interrogation fall precautions  check orthostatics   given report of fall with LOC a few weeks ago, would benefit from obtaining records from [facility], EP consult for device interrogation

## 2018-11-29 NOTE — H&P ADULT - HISTORY OF PRESENT ILLNESS
57-year-old female with     In the ED VS:  98.4  64-75  131-137/65-93  16-18  %RA, received ciprofloxacin 400mg IV x1, NS 1L IVF, morphine 2mg IV x4, ondansetron 4mg IV x1, simethicone 80mg PO x1 57-year-old female with HTN, CAD, CHF s/p AICD/PPM, gastritis, presenting from home with acute worsening of generalized/R>L sided abdominal pain that radiates to the back.  Patient reports the pain is severe, associated with nausea and vomiting and decreased PO intake.  She reports two recent visits to [facility], where she was diagnosed with a UTI and completed a course of both Bactrim and Levaquin, states her symptoms never completely resolved.  Patient reports hematuria as well as difficulty passing urine, feeling like she needs to force the urine out.  She reports chills but no fevers, weight loss due to decreased PO intake since Thanksgiving.  She also incidentally notes vaginal spotting (last menstrual cycle at age 48, no bleeding or spotting since that time, denies new sexual partners or vaginal discharge) and BRBPR on toilet paper associated with hard stool 3 days ago (with noted history of internal hemorrhoids on prior colonoscopy 2 years ago).  She reports dizziness, feeling as if the room is spinning, with a fall in the bathroom a few weeks ago without head trauma however (+)LOC, for which she was seen at [facility].  She reports recurrent left sided chest pain, last took nitroglycerin approximately 1 month ago, no current chest pain.      In the ED VS:  98.4  64-75  131-137/65-93  16-18  %RA, received ciprofloxacin 400mg IV x1, NS 1L IVF, morphine 2mg IV x4, ondansetron 4mg IV x1, simethicone 80mg PO x1

## 2018-11-29 NOTE — CONSULT NOTE ADULT - PROBLEM SELECTOR RECOMMENDATION 9
Acetaminophen is an analgesic and antipyretic.  Patients may present with abdominal pain but pain has been present before patient took acetaminophen.  LFTs normally rise after 24 hours from ingestion and I would have anticipated her LFTs to be elevated on initial blood work but patient may be a late presenter of acetaminophen toxicity.  Patient is currently getting IV n-acetylcysteine appropriately.  Dosing for IV NAC is as follows:    1st dose: 150mg/kg over 1 hour  2nd dose: 50mg/kg over 4 hours  3rd dose: 100mg/kg over 16 hours    This 3 dose protocol lasts 21 hours.  Please check LFTs and acetaminophen level 2 hours prior to completion of 3rd dose to determine need for another dose of IV NAC.  I anticipate acetaminophen level will be undetectable given initial level was negative, but I would still check this level 2 hour before completion of IV NAC.  If LFTs are 1/2 less than peak level of AST/ALT, patient does not need 4th dose of IV NAC.  However if patient's LFTs are not 1/2 their peak or are still rising, patient will need a 4th dose of IV NAC, which would be equivalent to the 3rd dose (100mg/kg over 16 hours).  Please contact toxicology with any questions [phone].

## 2018-11-29 NOTE — H&P ADULT - PROBLEM SELECTOR PLAN 1
weakly positive UA  f/u UCx weakly positive UA, leukocytosis noted no other SIRS criteria present  f/u UCx  c/w ciprofloxacin for now

## 2018-11-29 NOTE — PROGRESS NOTE ADULT - PROBLEM SELECTOR PLAN 6
call PMD for baseline, cannot seem to pull up H/H from [facility] on [facility]  will need outpatient f/u with gyn for new DUB  stool regimen given streaking of stool/BRB noted on TP likely secondary to known internal hemorrhoids; routine f/u with outpatient GI  trend H/H c/w aspirin, unclear why no longer on statin given patient's significant family history  clarify w/patient's cardiologist in AM  - hold statin given acute hepatitis

## 2018-11-29 NOTE — DISCHARGE NOTE ADULT - PLAN OF CARE
Do not take more than recommended amount of Tylenol When you came to the hospital you had severe right sided abdominal pain for which you were taking up to 21 Tylenol extra strength a day. This caused damage to your liver and your required treatment with antidote to Tylenol overdose. If it important that you limit your intake of Tylenol as your liver suffered damage. Followup with ____ You came to the hospital with severe right sided pain related to blockage of urine follow from your right kidney. You were evaluated by urology. Please avoid taking tylenol When you came to the hospital you had severe right sided abdominal pain for which you were taking up to 21 Tylenol extra strength a day. This caused damage to your liver and your required treatment with antidote to Tylenol overdose. If it important that you limit your intake of Tylenol as your liver suffered damage. Please avoid taking tylenol. In the future do not take more than four extra strength tylenol per day as your liver suffered injury from tylenol overdose. Followup with Primary doctor You came to the hospital with severe right sided pain. You were evaluated by urology and gynecology and no intervention was required. Please followup with your primary doctor You came to the hospital with severe right sided pain. You were evaluated by urology and gynecology and no intervention was required. Please followup with your primary doctor. Please take (up to three 200mg pills) no more every six hours. While taking ibuprofen please take an antiacid pill (Protonix) twice a day. Taking too much ibuprofen can cause worsening stomach pain. Your pain was likely related to imbalance between the nerves in your abdomen and your brain. It is important you continue taking your Seroquel and followup with your primary doctor. Followup with Urology Imaging showed urine backed up around your right kidney, however it was a small amount and no intervention was required. Please followup with Urology outpatient Followup with gynecology You reported vaginal spotting and pain. Ultrasound showed enlargement of your uterus, but causes of your severe pain. Please followup with gynecology outpatient. Please avoid taking Tylenol When you came to the hospital you had severe right sided abdominal pain for which you were taking up to 21 Tylenol extra strength a day. This caused damage to your liver and you required treatment with antidote to Tylenol overdose. If it important that you limit your intake of Tylenol as your liver suffered damage. Please avoid taking Tylenol. In the future do not take more than four extra strength Tylenol per day as your liver suffered injury from Tylenol overdose. Followup with Primary doctor tomorrow morning at 10:45am You came to the hospital with severe right sided pain. You were evaluated by urology and gynecology and no intervention was required. Please followup with your primary doctor. You may take ibuprofen (up to three 200mg pills) no more every six hours. While taking ibuprofen please take an antiacid pill (Protonix) twice a day. Taking too much ibuprofen can cause worsening stomach pain. Your pain was likely related to imbalance between the nerves in your abdomen and your brain. It is important you continue taking your Seroquel and followup with your primary doctor. When you came to the hospital you had severe right sided abdominal pain for which you were taking up to 21 Tylenol extra strength a day. This caused damage to your liver and you required treatment with antidote to Tylenol overdose. It is important that you avoid taking Tylenol as your liver suffered damage.  In the future do not take more than four extra strength Tylenol per day as your liver suffered injury from Tylenol overdose. Please followup with your primary doctor to have your liver numbers rechecked You came to the hospital with severe right sided pain. You were evaluated by urology and gynecology and no intervention was required. Please followup with your primary doctor. You may take ibuprofen (up to three 200mg pills) every six hours. While taking ibuprofen please take an antiacid pill (Protonix) twice a day. Taking too much ibuprofen can cause worsening stomach pain. Your pain was likely related to imbalance between the nerves in your abdomen and your brain. It is important you continue taking your Seroquel and followup with your primary doctor. You reported vaginal spotting and pain. Ultrasound showed enlargement of your uterus, but no causes of your severe pain. Please followup with gynecology outpatient. Followup with your primary doctor Please followup with your primary doctor to restart a statin medication for cholesterol control and discuss your heart disease management.

## 2018-11-29 NOTE — H&P ADULT - NSHPPHYSICALEXAM_GEN_ALL_CORE
PHYSICAL EXAM:  GENERAL: In moderate distress secondary to pain, well-developed, well-nourished  HEAD:  Atraumatic, Normocephalic  EYES: EOMI, PERRLA, conjunctiva and sclera clear  NECK: Supple, No JVD  CHEST/LUNG: Clear to auscultation bilaterally; No wheezes, rales or rhonchi  HEART: Regular rate and rhythm; No murmurs, rubs, or gallops, (+)S1, S2  ABDOMEN: Soft, Nondistended; Normal Bowel sounds; (+)TTP werner in RLQ  EXTREMITIES:  2+ Peripheral Pulses, No clubbing, cyanosis, or edema  PSYCH: normal mood and affect; denies SI/HI, however reports last time she was at [facility] she reported SI due to severe pain  NEUROLOGY: AAOx3, non-focal  SKIN: No lesions; dry scaly rash on R breast and L inguinal area with some associated hyperpigmentation PHYSICAL EXAM:  GENERAL: In moderate distress secondary to pain, well-developed, well-nourished  HEAD:  Atraumatic, Normocephalic  EYES: EOMI, PERRLA, conjunctiva and sclera clear  NECK: Supple, No JVD  CHEST/LUNG: Clear to auscultation bilaterally; No wheezes, rales or rhonchi  HEART: Regular rate and rhythm; No murmurs, rubs, or gallops, (+)S1, S2  ABDOMEN: Soft, Nondistended; Normal Bowel sounds; (+)TTP werner in RLQ; no CVA TTP b/l  EXTREMITIES:  2+ Peripheral Pulses, No clubbing, cyanosis, or edema  PSYCH: normal mood and affect; denies SI/HI, however reports last time she was at [facility] she reported SI due to severe pain  NEUROLOGY: AAOx3, non-focal  SKIN: No lesions; dry scaly rash on R breast and L inguinal area with some associated hyperpigmentation PHYSICAL EXAM:  GENERAL: In moderate distress secondary to pain, well-developed, well-nourished  HEAD:  Atraumatic, Normocephalic  EYES: EOMI, PERRLA, conjunctiva and sclera clear  NECK: Supple, No JVD  CHEST/LUNG: Clear to auscultation bilaterally; No wheezes, rales or rhonchi  HEART: Regular rate and rhythm; No murmurs, rubs, or gallops, (+)S1, S2  ABDOMEN: Soft, Nondistended; Normal Bowel sounds; (+)TTP werner in RLQ; negative Wiggins's sign; no CVA TTP b/l  EXTREMITIES:  2+ Peripheral Pulses, No clubbing, cyanosis, or edema  PSYCH: normal mood and affect; denies SI/HI, however reports last time she was at [facility] she reported SI due to severe pain  NEUROLOGY: AAOx3, non-focal  SKIN: No lesions; dry scaly rash on R breast and L inguinal area with some associated hyperpigmentation

## 2018-11-29 NOTE — PROGRESS NOTE ADULT - PROBLEM SELECTOR PLAN 4
fall precautions  check orthostatics   given report of fall with LOC a few weeks ago, would benefit from obtaining records from [facility], EP consult for device interrogation unclear if related to hydronephrosis noted on CT vs UTI; lipase wnl  CT as above  morphine PRN mod to severe pain (iSTOP Reference #: [id number] negative for Rx)  ondansetron PRN nausea/vomiting  simethicone PRN  needs gyn f/u for reported recently noted DUB (w/intercourse and noted on toilet paper on occasion per patient) Unclear etiology: hydronephrosis vs PUD given NSAID use vs hepatitis   - lipase wnl, pain has been getting worse throughout the week with her increasing NSAID/tylenol doses   (iSTOP Reference #: [id number] negative for Rx)  - ondansetron PRN nausea/vomiting  - simethicone PRN  - morphine PRN mod to severe pain

## 2018-11-29 NOTE — H&P ADULT - ASSESSMENT
57-year-old female with HTN, CAD, CHF s/p AICD/PPM, gastritis, presenting from home with acute worsening of generalized abdominal pain that radiates to the back found to have recurrent UTI and hydronephrosis.

## 2018-11-29 NOTE — DISCHARGE NOTE ADULT - MEDICATION SUMMARY - MEDICATIONS TO STOP TAKING
I will STOP taking the medications listed below when I get home from the hospital:    SEROquel 50 mg oral tablet  -- 1 tab(s) by mouth once a day I will STOP taking the medications listed below when I get home from the hospital:  None

## 2018-11-29 NOTE — H&P ADULT - NSHPOUTPATIENTPROVIDERS_GEN_ALL_CORE
PMD - Dr. Sunny Keyes; Dr. Osorio Elder PMD - Dr. Sunny Keyes; Dr. Osorio Elder  Cardiologist - Dr. Uday Mir

## 2018-11-29 NOTE — DISCHARGE NOTE ADULT - ADDITIONAL INSTRUCTIONS
Followup with primary doctor for your abdominal pain  Followup with urology for your right sided hydronephrosis  Followup with gynecology for vaginal spotting and enlarge uterus Followup with primary doctor for your abdominal pain Wednesday 12/5 at 10:45am  Followup with urology for your right sided hydronephrosis  Followup with gynecology for vaginal spotting and enlarge uterus

## 2018-11-29 NOTE — PROGRESS NOTE ADULT - PROBLEM SELECTOR PLAN 9
patient not currently tolerating PO, will give IV replacement  recheck BMP w/Mg likely reactive in setting of UTI  monitor/trend    #Hypokalemia  - IV repletion as pt not tolerating PO Likely reactive in setting of UTI  - monitor/trend    #Hypokalemia  - IV repletion as pt not tolerating PO

## 2018-11-29 NOTE — H&P ADULT - NSHPLABSRESULTS_GEN_ALL_CORE
142  |  101  |  13  ----------------------------<  103<H>  3.4<L>   |  25  |  0.99    Ca    10.1      2018 17:30    TPro  7.6  /  Alb  4.1  /  TBili  0.4  /  DBili  x   /  AST  14  /  ALT  6   /  AlkPhos  78                          10.6   12.67 )-----------( 484      ( 2018 17:30 )             31.9     Urinalysis Basic - ( 2018 17:30 )  Color: YELLOW / Appearance: CLEAR / S.040 / pH: 6.0  Gluc: NEGATIVE / Ketone: TRACE  / Bili: NEGATIVE / Urobili: NORMAL   Blood: NEGATIVE / Protein: 70 / Nitrite: NEGATIVE   Leuk Esterase: SMALL / RBC: 6-10 / WBC 11-25   Sq Epi: MODERATE / Non Sq Epi: x / Bacteria: NEGATIVE    18:20 - VBG - pH: 7.37  | pCO2: 52    | pO2: 19    | Lactate: 1.3      < from: CT Abdomen and Pelvis w/ Oral Cont and w/ IV Cont (.18 @ 19:52) >  LOWER CHEST: Partially imaged AICD leads.  LIVER: Subcentimeter hypodense lesions, too small to characterize.  BILE DUCTS: Normal caliber.  GALLBLADDER: Cholecystectomy. Mild central and extrahepatic biliary ductal dilatation.  SPLEEN: Within normal limits.  PANCREAS: Within normal limits.  ADRENALS: Within normal limits.  KIDNEYS/URETERS: Mild right hydronephrosis and hydroureter with no radiopaque obstructing entity noted. Left kidney is within normal limits. Subcentimeter hypodense lesions, too small to characterize.  BLADDER: Within normal limits.  REPRODUCTIVE ORGANS: Uterus and adnexa are within normal limits.  BOWEL: No bowel obstruction. Appendix is normal.  PERITONEUM: No ascites.  VESSELS:  Atherosclerotic disease.  RETROPERITONEUM: No lymphadenopathy.    ABDOMINAL WALL: Within normal limits.  BONES: Within normal limits.  IMPRESSION: Mild right hydronephrosis and hydroureter with no radiopaque obstructing entity noted.  < end of copied text >     142  |  101  |  13  ----------------------------<  103<H>  3.4<L>   |  25  |  0.99    Ca    10.1      2018 17:30    TPro  7.6  /  Alb  4.1  /  TBili  0.4  /  DBili  x   /  AST  14  /  ALT  6   /  AlkPhos  78                          10.6   12.67 )-----------( 484      ( 2018 17:30 )             31.9     Urinalysis Basic - ( 2018 17:30 )  Color: YELLOW / Appearance: CLEAR / S.040 / pH: 6.0  Gluc: NEGATIVE / Ketone: TRACE  / Bili: NEGATIVE / Urobili: NORMAL   Blood: NEGATIVE / Protein: 70 / Nitrite: NEGATIVE   Leuk Esterase: SMALL / RBC: 6-10 / WBC 11-25   Sq Epi: MODERATE / Non Sq Epi: x / Bacteria: NEGATIVE    Lipase, Serum: 28.9 U/L (18 @ 17:30)    18:20 - VBG - pH: 7.37  | pCO2: 52    | pO2: 19    | Lactate: 1.3      < from: CT Abdomen and Pelvis w/ Oral Cont and w/ IV Cont (18 @ 19:52) >  LOWER CHEST: Partially imaged AICD leads.  LIVER: Subcentimeter hypodense lesions, too small to characterize.  BILE DUCTS: Normal caliber.  GALLBLADDER: Cholecystectomy. Mild central and extrahepatic biliary ductal dilatation.  SPLEEN: Within normal limits.  PANCREAS: Within normal limits.  ADRENALS: Within normal limits.  KIDNEYS/URETERS: Mild right hydronephrosis and hydroureter with no radiopaque obstructing entity noted. Left kidney is within normal limits. Subcentimeter hypodense lesions, too small to characterize.  BLADDER: Within normal limits.  REPRODUCTIVE ORGANS: Uterus and adnexa are within normal limits.  BOWEL: No bowel obstruction. Appendix is normal.  PERITONEUM: No ascites.  VESSELS:  Atherosclerotic disease.  RETROPERITONEUM: No lymphadenopathy.    ABDOMINAL WALL: Within normal limits.  BONES: Within normal limits.  IMPRESSION: Mild right hydronephrosis and hydroureter with no radiopaque obstructing entity noted.  < end of copied text >    EKG personally reviewed - 68bpm sinus/V-paced rhythm, TWI III, QTc 442ms

## 2018-11-29 NOTE — DISCHARGE NOTE ADULT - MEDICATION SUMMARY - MEDICATIONS TO TAKE
I will START or STAY ON the medications listed below when I get home from the hospital:    Aspirin Enteric Coated 81 mg oral delayed release tablet  -- 1 tab(s) by mouth once a day  -- Indication: For CAD (coronary artery disease)    ibuprofen 600 mg oral tablet  -- 1 tab(s) by mouth every 6 hours, As needed, Moderate Pain (4 - 6), Severe Pain (7 - 10)  -- Indication: For Stomach Pain    Entresto 24 mg-26 mg oral tablet  -- 1 tab(s) by mouth 2 times a day  -- Indication: For CHF (congestive heart failure)    magnesium hydroxide 8% oral suspension  -- 30 milliliter(s) by mouth once a day, As needed, Constipation  -- Indication: For Stomach Pain    SEROquel 50 mg oral tablet  -- 1 tab(s) by mouth once a day (at bedtime)  -- Indication: For Depression    carvedilol 25 mg oral tablet  -- 1 tab(s) by mouth 2 times a day  -- Indication: For CHF (congestive heart failure)    docusate sodium 100 mg oral capsule  -- 1 cap(s) by mouth 3 times a day, As Needed  -- Indication: For Constipation    polyethylene glycol 3350 oral powder for reconstitution  -- 17 gram(s) by mouth 2 times a day, As Needed  -- Indication: For Constipation    senna oral tablet  -- 2 tab(s) by mouth once a day (at bedtime), As Needed  -- Indication: For Constipation    simethicone 80 mg oral tablet, chewable  -- 1 tab(s) by mouth every 6 hours, As needed, Gas  -- Indication: For Stomach Pain I will START or STAY ON the medications listed below when I get home from the hospital:    Aspirin Enteric Coated 81 mg oral delayed release tablet  -- 1 tab(s) by mouth once a day  -- Indication: For CAD (coronary artery disease)    ibuprofen 600 mg oral tablet  -- 1 tab(s) by mouth every 6 hours, As needed, Moderate Pain (4 - 6), Severe Pain (7 - 10)  -- Indication: For Abdominal pain    Entresto 24 mg-26 mg oral tablet  -- 1 tab(s) by mouth 2 times a day  -- Indication: For CHF (congestive heart failure)    magnesium hydroxide 8% oral suspension  -- 30 milliliter(s) by mouth once a day, As needed, Constipation  -- Indication: For Stomach Pain    SEROquel 50 mg oral tablet  -- 1 tab(s) by mouth once a day (at bedtime)  -- Indication: For Depression    carvedilol 25 mg oral tablet  -- 1 tab(s) by mouth 2 times a day  -- Indication: For CHF (congestive heart failure)    docusate sodium 100 mg oral capsule  -- 1 cap(s) by mouth 3 times a day, As Needed  -- Indication: For Constipation    polyethylene glycol 3350 oral powder for reconstitution  -- 17 gram(s) by mouth 2 times a day, As Needed  -- Indication: For Constipation    senna oral tablet  -- 2 tab(s) by mouth once a day (at bedtime), As Needed  -- Indication: For Constipation    simethicone 80 mg oral tablet, chewable  -- 1 tab(s) by mouth every 6 hours, As needed, for abdominal discomfort  -- Indication: For Stomach pain    pantoprazole 40 mg oral delayed release tablet  -- 1 tab(s) by mouth once a day (before a meal)  -- Indication: For Acid reflux

## 2018-11-29 NOTE — DISCHARGE NOTE ADULT - MEDICATION SUMMARY - MEDICATIONS TO CHANGE
I will SWITCH the dose or number of times a day I take the medications listed below when I get home from the hospital:  None I will SWITCH the dose or number of times a day I take the medications listed below when I get home from the hospital:    SEROquel 50 mg oral tablet  -- 1 tab(s) by mouth once a day

## 2018-11-29 NOTE — CHART NOTE - NSCHARTNOTEFT_GEN_A_CORE
Pt had myocardial perfusion SPECT performed 10/29/2018 with Dr. LUIS Mir. Revealed partially reversible inferior defect. LV global function at post-stress was normal with EF 51%. Normal wall motion and normal thickening of all myocardial segments. Study read as equivocal, and PET scan recommended give risk profile. Study ordered by Dr. Andrea Vera.  Dr. Vera office contacted and reports she is not a pt there. Pt's meds list CHERYL Keyes as provider, but office report's pt is not known to them. Message left with Dr. Osorio Elder (pt's PMD), awaiting response. Pt's follows with [facility] device clinic for AICD, attempting to obtain records. Pt had myocardial perfusion SPECT performed 10/29/2018 with Dr. LUIS Mir. Revealed partially reversible inferior defect. LV global function at post-stress was normal with EF 51%. Normal wall motion and normal thickening of all myocardial segments. Study read as equivocal, and PET scan recommended give risk profile. Study ordered by Dr. Andrea Vera.  Dr. Vera office contacted and reports she is not a pt there. Pt's meds list CHERYL Keyes as provider, but office report's pt is not known to them. Message left with Dr. Osorio Elder (pt's PMD), awaiting response. Pt follows with [facility] device clinic for AICD, attempting to obtain records.

## 2018-11-29 NOTE — CONSULT NOTE ADULT - ASSESSMENT
57F PMH HTN, CAD, CHF s/p AICD/PPM, and gastritis presents with progressively worsening abdominal pain found to have elevated LFTs after admitting to excess acetaminophen ingestion.

## 2018-11-29 NOTE — ED ADULT NURSE NOTE - NSIMPLEMENTINTERV_GEN_ALL_ED
Implemented All Universal Safety Interventions:  [place] to call system. Call bell, personal items and telephone within reach. Instruct patient to call for assistance. Room bathroom lighting operational. Non-slip footwear when patient is off stretcher. Physically safe environment: no spills, clutter or unnecessary equipment. Stretcher in lowest position, wheels locked, appropriate side rails in place.

## 2018-11-29 NOTE — CONSULT NOTE ADULT - PROBLEM SELECTOR RECOMMENDATION 9
-Check PVRs  -pelvic exam for vaginal bleeding. -Check PVRs  -Continue antibiotics  -Follow-up urine culture -Check PVRs  -Continue antibiotics  -Follow-up urine culture  -Medicine for possible pyelonephritis  -Pelvic exam, grossly normal  -f/u Cr, monitor for signs of hemodynamic instability/fevers  -check outside records for any recent kidney imaging  -no emergent  intervention at this time, would recommend outpatient follow-up for acute? hydronephrosis if discharged -  -Check if adequately voiding, PVR bladder scan  -Continue antibiotics  -Follow-up urine culture  -Medicine for possible pyelonephritis  -Pelvic exam, grossly normal  -f/u Cr, monitor for signs of hemodynamic instability/fevers  -check outside records for any recent kidney imaging  -no emergent  intervention at this time, would recommend outpatient follow-up for acute? hydronephrosis if discharged -  - Check if adequately voiding, PVR bladder scan  - Continue antibiotics  - Follow-up urine culture  - Medicine for possible pyelonephritis  - Pelvic exam, grossly normal  - Monitor for signs of hemodynamic instability/fevers  - Check outside records for any recent kidney imaging  - No emergent  intervention at this time, would recommend outpatient follow-up for acute? hydronephrosis if discharged

## 2018-11-29 NOTE — CONSULT NOTE ADULT - ATTENDING COMMENTS
MD Ye:  patient seen and evaluated with the fellow.  I was present for key portions of the history & physical, and I agree with the impression & plan.  56 yo F, admitted to inpatient medicine for evaluation of abdominal pain of unclear etiology.  While admitted, the patient's repeat AST/ALT bumped from the normal range to the 700-range.  When asked, the patient endorsed self-medicating with  21 tabs 500mg APAP/day for "a week." To be clear, the patient was experiencing abdominal pain and N/V before she began taking APAP for pain.  Currently, she has diffuse abdominal pain (lower > upper), with active emesis.  Given the Hx of ingesting APAP 10+gm/day for 4-5days prior to admission to the hospital, empiric NAC was started for presumed APAP-induced liver injury.  Subsequent laboratory/imaging investigation revealed an undetectable APAP level, a diminished AST/ALT, and no evidence of retained gallstones on US (patient's GB was removed years ago).  Without another clear etiology of her hepatitis, and a history of large chronic APAP ingestion, it is likely that her transaminitis is indeed due to APAP ingestion.  However, with declining AST/ALT, normal INR and bili, we do not anticipate that the patient will develop fulminant APAP-induced liver failure.  Recs: continue IV NAC, as per 21 hour course until AST/ALT are 50% peak values.  Etiology of abdominal pain unknown at this time.
patient seems to have obstruction from gynecological structures on imaging  bloodwork stable at this time  urology will continue to follow

## 2018-11-29 NOTE — DISCHARGE NOTE ADULT - PATIENT PORTAL LINK FT
You can access the [facility] Patient Portal, offered by [facility], by registering with the following website: [email or web address]

## 2018-11-29 NOTE — PROGRESS NOTE ADULT - PROBLEM SELECTOR PLAN 2
appreciate  recs  check PVR bladder scan  f/u UCx weakly positive UA, leukocytosis noted no other SIRS criteria present  f/u UCx  c/w ciprofloxacin for now weakly positive UA, leukocytosis noted no other SIRS criteria present  - pending urine Cx  - ciprofloxacin 500mg Q12H unimpressive UTI, was treated with bactrim and levaquin without relief.  Do not think UTI is causing symptoms, will d/c cipro as was already on levaquin without relief.

## 2018-11-29 NOTE — PROGRESS NOTE ADULT - PROBLEM SELECTOR PLAN 8
likely reactive in setting of UTI  monitor/trend s/p PPM/AICD, per patient due for f/u visit with cardiologist  c/w Entresto, carvedilol, aspirin; not on any diuretics at present  outpatient f/u with cardiologist  Strict I/Os, daily weights s/p PPM/AICD, per patient due for f/u visit with cardiologist  c/w Entresto, carvedilol, aspirin; not on any diuretics at present  outpatient f/u with cardiologist  Strict I/Os, daily weights  EP consult as device overdue for replacement

## 2018-11-29 NOTE — H&P ADULT - NSHPSOCIALHISTORY_GEN_ALL_CORE
Lives with boyfriend, denies any new sexual partners  Smokes 2-3 cigarettes/day  Reports occasional alcohol use, no use since Thanksgiving  Smokes marijuana denies other illicit drug use

## 2018-11-29 NOTE — PROGRESS NOTE ADULT - ASSESSMENT
57 year old female with a medical history significant for HTN, CAD, CHF, s/p PPM/AICD, gastritis and fibroids presenting to the ED with a 1 month history of worsening abdominal pain, nausea and vomiting, s/p a couple courses of antibiotics (Bactrim and Levaquin), with Righ Hydronephrosis without visible stone   - Suspect right sided hydronephrosis 2/2 to uterine abnormality with ureteral compression, recommend ob/gyn consultation for evaluation of possible uterine abnormality.

## 2018-11-29 NOTE — PROGRESS NOTE ADULT - PROBLEM SELECTOR PLAN 5
c/w aspirin, unclear why no longer on statin given patient's significant family history  clarify w/patient's cardiologist in AM fall precautions  check orthostatics   given report of fall with LOC a few weeks ago, would benefit from obtaining records from [facility], EP consult for device interrogation Pt reports severe pain and poor PO intake: likely orthostatic  - fall precautions  - will check orthostatics   - EP consult for device interrogation

## 2018-11-29 NOTE — H&P ADULT - PROBLEM SELECTOR PLAN 6
call PMD for baseline, cannot seem to pull up H/H from [facility] on [facility]  will need outpatient f/u with gyn for new DUB call PMD for baseline, cannot seem to pull up H/H from [facility] on [facility]  will need outpatient f/u with gyn for new DUB  stool regimen given streaking of stool/BRB noted on TP likely secondary to known internal hemorrhoids; routine f/u with outpatient GI  trend H/H

## 2018-11-29 NOTE — PROGRESS NOTE ADULT - ASSESSMENT
57-year-old female with HTN, CAD, CHF s/p AICD/PPM, gastritis, presenting from home with acute worsening of generalized abdominal pain that radiates to the back found to have recurrent UTI and hydronephrosis. 57-year-old female with HTN, CAD, CHF s/p AICD/PPM, gastritis, presenting from home with acute worsening of generalized abdominal pain that radiates to the back found to have recurrent UTI and hydronephrosis, now with acute hepatitis

## 2018-11-29 NOTE — CONSULT NOTE ADULT - ASSESSMENT
57 year old female with a medical history significant for HTN, CAD, CHF, s/p PPM/AICD, gastritis and fibroids presenting to the ED with a 1 month history of worsening abdominal pain, nausea and vomiting, s/p a couple courses of antibiotics (Bactrim and Levaquin), with Righ Hydronephrosis without visible stone.

## 2018-11-29 NOTE — DISCHARGE NOTE ADULT - HOSPITAL COURSE
Ms. Campos is a 56y/o F with Ms. Campos is a 56y/o F with HTN, CAD, CHF s/p AICD/PPM, and gastritis presented with worsening acute on chronic left flank pain. On admission pt was noted to have taken 10.5g of Tylenol on the day prior to admission, NAC protocol was initiated and toxicology was consulted given acute hepatitis. LFTs downtrended. Urology evaluated pt and reported uterine compression of right urethra. Ms. Campos is a 56y/o F with HTN, CAD, CHF s/p AICD/PPM, and gastritis presented with worsening acute on chronic left flank pain. On admission pt was noted to have taken 10.5g of Tylenol on the day prior to admission, NAC protocol was initiated and toxicology was consulted given acute hepatitis. LFTs downtrended. Urology evaluated pt for mild R hydronephrosis, no acute intervention. Gyn evaluated pt, pelvis U/S performed showing enlarged uterus with mild endometrial hyperplasia. Pt's pain improved and is likely psychogenic.    Patient is currently stable to discharge to home with followup with PCP for abd pain, Urology f/u for hydronephrosis, and gyn f/u for vaginal spotting/endometrial hyperplasia Ms. Campos is a 56y/o F with HTN, CAD, CHF s/p AICD/PPM, and gastritis presented with worsening acute on chronic left flank pain. On admission pt was noted to have taken 10.5g of Tylenol on the day prior to admission, NAC protocol was initiated and toxicology was consulted given acute hepatitis. LFTs downtrended. Urology evaluated pt for mild R hydronephrosis; no acute intervention. Gyn evaluated pt, pelvis U/S performed showing enlarged uterus with mild endometrial hyperplasia. Pt's pain improved and is likely psychogenic. Pt reports long history of pain with prior SI, but pt denies SI or Sx of depression at this time.    Patient is currently stable to discharge to home with followup with PCP for abd pain, Urology f/u for hydronephrosis, and gyn f/u for vaginal spotting/endometrial hyperplasia

## 2018-11-30 LAB
ALBUMIN SERPL ELPH-MCNC: 3.6 G/DL — SIGNIFICANT CHANGE UP (ref 3.3–5)
ALP SERPL-CCNC: 169 U/L — HIGH (ref 40–120)
ALT FLD-CCNC: 321 U/L — HIGH (ref 4–33)
APAP SERPL-MCNC: < 15 UG/ML — LOW (ref 15–25)
AST SERPL-CCNC: 229 U/L — HIGH (ref 4–32)
BACTERIA UR CULT: SIGNIFICANT CHANGE UP
BASOPHILS # BLD AUTO: 0.06 K/UL — SIGNIFICANT CHANGE UP (ref 0–0.2)
BASOPHILS NFR BLD AUTO: 0.5 % — SIGNIFICANT CHANGE UP (ref 0–2)
BILIRUB DIRECT SERPL-MCNC: 0.3 MG/DL — HIGH (ref 0.1–0.2)
BILIRUB SERPL-MCNC: 1.6 MG/DL — HIGH (ref 0.2–1.2)
BUN SERPL-MCNC: 9 MG/DL — SIGNIFICANT CHANGE UP (ref 7–23)
CALCIUM SERPL-MCNC: 9.8 MG/DL — SIGNIFICANT CHANGE UP (ref 8.4–10.5)
CHLORIDE SERPL-SCNC: 101 MMOL/L — SIGNIFICANT CHANGE UP (ref 98–107)
CO2 SERPL-SCNC: 27 MMOL/L — SIGNIFICANT CHANGE UP (ref 22–31)
CREAT SERPL-MCNC: 0.89 MG/DL — SIGNIFICANT CHANGE UP (ref 0.5–1.3)
DIGOXIN SERPL-MCNC: < 0.3 NG/ML — LOW (ref 0.8–2)
EOSINOPHIL # BLD AUTO: 0.68 K/UL — HIGH (ref 0–0.5)
EOSINOPHIL NFR BLD AUTO: 5.3 % — SIGNIFICANT CHANGE UP (ref 0–6)
GLUCOSE SERPL-MCNC: 140 MG/DL — HIGH (ref 70–99)
HAV IGM SER-ACNC: NONREACTIVE — SIGNIFICANT CHANGE UP
HBV CORE IGM SER-ACNC: NONREACTIVE — SIGNIFICANT CHANGE UP
HBV SURFACE AB SER-ACNC: REACTIVE — SIGNIFICANT CHANGE UP
HBV SURFACE AG SER-ACNC: NONREACTIVE — SIGNIFICANT CHANGE UP
HCT VFR BLD CALC: 28.8 % — LOW (ref 34.5–45)
HCV AB S/CO SERPL IA: 0.14 S/CO — SIGNIFICANT CHANGE UP
HCV AB SERPL-IMP: SIGNIFICANT CHANGE UP
HGB BLD-MCNC: 9.6 G/DL — LOW (ref 11.5–15.5)
IMM GRANULOCYTES # BLD AUTO: 0.08 # — SIGNIFICANT CHANGE UP
IMM GRANULOCYTES NFR BLD AUTO: 0.6 % — SIGNIFICANT CHANGE UP (ref 0–1.5)
LYMPHOCYTES # BLD AUTO: 17.9 % — SIGNIFICANT CHANGE UP (ref 13–44)
LYMPHOCYTES # BLD AUTO: 2.28 K/UL — SIGNIFICANT CHANGE UP (ref 1–3.3)
MAGNESIUM SERPL-MCNC: 2 MG/DL — SIGNIFICANT CHANGE UP (ref 1.6–2.6)
MCHC RBC-ENTMCNC: 30.7 PG — SIGNIFICANT CHANGE UP (ref 27–34)
MCHC RBC-ENTMCNC: 33.3 % — SIGNIFICANT CHANGE UP (ref 32–36)
MCV RBC AUTO: 92 FL — SIGNIFICANT CHANGE UP (ref 80–100)
MONOCYTES # BLD AUTO: 1 K/UL — HIGH (ref 0–0.9)
MONOCYTES NFR BLD AUTO: 7.8 % — SIGNIFICANT CHANGE UP (ref 2–14)
NEUTROPHILS # BLD AUTO: 8.65 K/UL — HIGH (ref 1.8–7.4)
NEUTROPHILS NFR BLD AUTO: 67.9 % — SIGNIFICANT CHANGE UP (ref 43–77)
NRBC # FLD: 0 — SIGNIFICANT CHANGE UP
PHOSPHATE SERPL-MCNC: 2.7 MG/DL — SIGNIFICANT CHANGE UP (ref 2.5–4.5)
PLATELET # BLD AUTO: 452 K/UL — HIGH (ref 150–400)
PMV BLD: 9.3 FL — SIGNIFICANT CHANGE UP (ref 7–13)
POTASSIUM SERPL-MCNC: 3 MMOL/L — LOW (ref 3.5–5.3)
POTASSIUM SERPL-SCNC: 3 MMOL/L — LOW (ref 3.5–5.3)
PROT SERPL-MCNC: 6.7 G/DL — SIGNIFICANT CHANGE UP (ref 6–8.3)
RBC # BLD: 3.13 M/UL — LOW (ref 3.8–5.2)
RBC # FLD: 13 % — SIGNIFICANT CHANGE UP (ref 10.3–14.5)
SALICYLATES SERPL-MCNC: < 5 MG/DL — LOW (ref 15–30)
SODIUM SERPL-SCNC: 142 MMOL/L — SIGNIFICANT CHANGE UP (ref 135–145)
SPECIMEN SOURCE: SIGNIFICANT CHANGE UP
WBC # BLD: 12.75 K/UL — HIGH (ref 3.8–10.5)
WBC # FLD AUTO: 12.75 K/UL — HIGH (ref 3.8–10.5)

## 2018-11-30 PROCEDURE — 93306 TTE W/DOPPLER COMPLETE: CPT | Mod: 26

## 2018-11-30 PROCEDURE — 99233 SBSQ HOSP IP/OBS HIGH 50: CPT | Mod: GC

## 2018-11-30 RX ORDER — POTASSIUM CHLORIDE 20 MEQ
20 PACKET (EA) ORAL
Qty: 0 | Refills: 0 | Status: COMPLETED | OUTPATIENT
Start: 2018-11-30 | End: 2018-11-30

## 2018-11-30 RX ORDER — PANTOPRAZOLE SODIUM 20 MG/1
40 TABLET, DELAYED RELEASE ORAL
Qty: 0 | Refills: 0 | Status: DISCONTINUED | OUTPATIENT
Start: 2018-11-30 | End: 2018-12-04

## 2018-11-30 RX ORDER — POLYETHYLENE GLYCOL 3350 17 G/17G
17 POWDER, FOR SOLUTION ORAL DAILY
Qty: 0 | Refills: 0 | Status: DISCONTINUED | OUTPATIENT
Start: 2018-11-30 | End: 2018-12-02

## 2018-11-30 RX ADMIN — CARVEDILOL PHOSPHATE 25 MILLIGRAM(S): 80 CAPSULE, EXTENDED RELEASE ORAL at 06:09

## 2018-11-30 RX ADMIN — MORPHINE SULFATE 4 MILLIGRAM(S): 50 CAPSULE, EXTENDED RELEASE ORAL at 15:48

## 2018-11-30 RX ADMIN — SENNA PLUS 2 TABLET(S): 8.6 TABLET ORAL at 21:38

## 2018-11-30 RX ADMIN — SACUBITRIL AND VALSARTAN 1 TABLET(S): 24; 26 TABLET, FILM COATED ORAL at 06:09

## 2018-11-30 RX ADMIN — PANTOPRAZOLE SODIUM 40 MILLIGRAM(S): 20 TABLET, DELAYED RELEASE ORAL at 12:13

## 2018-11-30 RX ADMIN — SACUBITRIL AND VALSARTAN 1 TABLET(S): 24; 26 TABLET, FILM COATED ORAL at 17:04

## 2018-11-30 RX ADMIN — QUETIAPINE FUMARATE 50 MILLIGRAM(S): 200 TABLET, FILM COATED ORAL at 21:38

## 2018-11-30 RX ADMIN — POLYETHYLENE GLYCOL 3350 17 GRAM(S): 17 POWDER, FOR SOLUTION ORAL at 17:05

## 2018-11-30 RX ADMIN — QUETIAPINE FUMARATE 50 MILLIGRAM(S): 200 TABLET, FILM COATED ORAL at 12:10

## 2018-11-30 RX ADMIN — MORPHINE SULFATE 4 MILLIGRAM(S): 50 CAPSULE, EXTENDED RELEASE ORAL at 21:37

## 2018-11-30 RX ADMIN — Medication 20 MILLIEQUIVALENT(S): at 17:04

## 2018-11-30 RX ADMIN — MORPHINE SULFATE 4 MILLIGRAM(S): 50 CAPSULE, EXTENDED RELEASE ORAL at 21:46

## 2018-11-30 RX ADMIN — MORPHINE SULFATE 4 MILLIGRAM(S): 50 CAPSULE, EXTENDED RELEASE ORAL at 09:59

## 2018-11-30 RX ADMIN — SIMETHICONE 80 MILLIGRAM(S): 80 TABLET, CHEWABLE ORAL at 15:35

## 2018-11-30 RX ADMIN — MORPHINE SULFATE 4 MILLIGRAM(S): 50 CAPSULE, EXTENDED RELEASE ORAL at 09:44

## 2018-11-30 RX ADMIN — Medication 81 MILLIGRAM(S): at 12:10

## 2018-11-30 RX ADMIN — MORPHINE SULFATE 4 MILLIGRAM(S): 50 CAPSULE, EXTENDED RELEASE ORAL at 15:33

## 2018-11-30 RX ADMIN — Medication 100 MILLIGRAM(S): at 12:11

## 2018-11-30 RX ADMIN — Medication 100 MILLIGRAM(S): at 21:42

## 2018-11-30 RX ADMIN — Medication 100 MILLIGRAM(S): at 06:09

## 2018-11-30 RX ADMIN — Medication 20 MILLIEQUIVALENT(S): at 12:10

## 2018-11-30 RX ADMIN — CARVEDILOL PHOSPHATE 25 MILLIGRAM(S): 80 CAPSULE, EXTENDED RELEASE ORAL at 17:04

## 2018-11-30 NOTE — PROGRESS NOTE ADULT - PROBLEM SELECTOR PLAN 7
Will contact PMD for baseline. Pt reports vaginal spotting w/ intercourse and occasional rectal bleeding with wiping (known internal hemorrhoids) Pt reports she is up to date on pap and colonoscopy  - trend H/H  - bowel regiment  - o/p f/u with Gyn and GI

## 2018-11-30 NOTE — PROGRESS NOTE ADULT - SUBJECTIVE AND OBJECTIVE BOX
Authored by Dr Charles Rosas [phone] [facility] / [id number]    Patient is a 57y old  Female who presents with a chief complaint of abdominal pain (2018 20:12)    SUBJECTIVE / OVERNIGHT EVENTS: No acute events overnight.   This morning pt reports hematuria in bed pan, however bed pan contains dark yellow urine w/o blood. Pt states her abd pain is much improved overnight, but still present.     MEDICATIONS  (STANDING):  aspirin enteric coated 81 milliGRAM(s) Oral daily  carvedilol 25 milliGRAM(s) Oral every 12 hours  docusate sodium 100 milliGRAM(s) Oral three times a day  influenza   Vaccine 0.5 milliLiter(s) IntraMuscular once  QUEtiapine 50 milliGRAM(s) Oral at bedtime  QUEtiapine 50 milliGRAM(s) Oral daily  sacubitril 24 mG/valsartan 26 mG 1 Tablet(s) Oral two times a day  senna 2 Tablet(s) Oral at bedtime  sodium chloride 0.45%. 1000 milliLiter(s) (50 mL/Hr) IV Continuous <Continuous>    MEDICATIONS  (PRN):  morphine  - Injectable 4 milliGRAM(s) IV Push every 4 hours PRN Severe Pain (7 - 10)  morphine  - Injectable 2 milliGRAM(s) IV Push every 4 hours PRN Moderate Pain (4 - 6)  ondansetron Injectable 4 milliGRAM(s) IV Push every 8 hours PRN Nausea and/or Vomiting  simethicone 80 milliGRAM(s) Chew every 6 hours PRN Gas      Vital Signs Last 24 Hrs  T(C): 36.9 (2018 05:33), Max: 37.2 (2018 21:43)  T(F): 98.5 (2018 05:33), Max: 99 (2018 21:43)  HR: 78 (2018 05:33) (65 - 78)  BP: 122/65 (2018 05:33) (122/65 - 151/86)  BP(mean): --  RR: 18 (2018 05:33) (17 - 20)  SpO2: 100% (2018 05:33) (99% - 100%)  CAPILLARY BLOOD GLUCOSE        I&O's Summary    2018 07:01  -  2018 07:00  --------------------------------------------------------  IN: 1190 mL / OUT: 320 mL / NET: 870 mL        PHYSICAL EXAM  GENERAL: In NAd, well-developed, well-nourished  HEAD:  Atraumatic, Normocephalic  EYES: EOMI, PERRLA, conjunctiva and sclera clear  NECK: Supple  CHEST/LUNG: Clear to auscultation bilaterally; No wheezes, rales or rhonchi  HEART: Regular rate and rhythm; No murmurs, rubs, or gallops, (+)S1, S2  ABDOMEN: Soft, Nondistended; Normal Bowel sounds; (+)TTP werner in RLQ; no CVA TTP b/l, No rebound, no guarding   EXTREMITIES:  2+ Peripheral Pulses, No clubbing, cyanosis, or edema  PSYCH: normal mood and affect  NEUROLOGY: AAOx3, non-focal  SKIN: No lesions or ecchymosis     LABS:                        9.7    10.40 )-----------( 406      ( 2018 08:30 )             29.3         142  |  104  |  9   ----------------------------<  108<H>  3.4<L>   |  27  |  0.86    Ca    9.4      2018 11:58  Phos  3.4       Mg     1.9         TPro  6.6  /  Alb  3.5  /  TBili  0.4  /  DBili  x   /  AST  611<H>  /  ALT  451<H>  /  AlkPhos  176<H>      PT/INR - ( 2018 11:58 )   PT: 12.3 SEC;   INR: 1.10          PTT - ( 2018 11:58 )  PTT:30.1 SEC      Urinalysis Basic - ( 2018 17:30 )    Color: YELLOW / Appearance: CLEAR / S.040 / pH: 6.0  Gluc: NEGATIVE / Ketone: TRACE  / Bili: NEGATIVE / Urobili: NORMAL   Blood: NEGATIVE / Protein: 70 / Nitrite: NEGATIVE   Leuk Esterase: SMALL / RBC: 6-10 / WBC 11-25   Sq Epi: MODERATE / Non Sq Epi: x / Bacteria: NEGATIVE          RADIOLOGY & ADDITIONAL TESTS:    Imaging Personally Reviewed: No new imaging  Consultant(s) Notes Reviewed:  EP, Toxicology, Urology  Care Discussed with Consultants/Other Providers: Toxicology, EP

## 2018-11-30 NOTE — PROGRESS NOTE ADULT - PROBLEM SELECTOR PLAN 6
c/w aspirin, unclear why no longer on statin given patient's significant family history  clarify w/patient's o/p providers  - hold statin given acute hepatitis  - continue to attempt to obtain records

## 2018-11-30 NOTE — PROGRESS NOTE ADULT - PROBLEM SELECTOR PLAN 8
s/p PPM/AICD, per patient due for f/u visit with cardiologist  c/w Entresto, carvedilol, aspirin; not on any diuretics at present  outpatient f/u with cardiologist  Strict I/Os, daily weights  EP consult as device overdue for replacement

## 2018-11-30 NOTE — PROGRESS NOTE ADULT - PROBLEM SELECTOR PLAN 9
Likely reactive in setting of UTI  - monitor/trend    #Hypokalemia  - IV repletion as pt not tolerating PO

## 2018-11-30 NOTE — CONSULT NOTE ADULT - SUBJECTIVE AND OBJECTIVE BOX
MIKAEL MCCULLOUGH  57y  Female [id number]    HPI:  58 yo F G0 LMP age 48 with PMH HTN, CAD, CHF s/p AICD/PPM, gastritis, presenting from home with acute worsening of generalized/R>L sided abdominal pain that radiates to the back.  Patient reports the pain is severe, associated with nausea and vomiting and decreased PO intake.  She reports two recent visits to [facility], where she was diagnosed with a UTI and completed a course of both Bactrim and Levaquin, states her symptoms never completely resolved.   Pt reports decreased po intake over past 2 weeks due to abd pain and dizziness.    Pt reports due to her abd pain she was taking as many as 21 tylenol tabs for 2 weeks.  Pt found to have acute hepatitis- most likely iatrogenic.     During workup for abd pain, CTAP performed which revealed mild R hydronephrosis.  Gyn consult called to evaluate uterus as cause of hydronephrosis. Pt reports a history of uterine fibroids. LMP at age 48 however experiences spotting after intercourse.  Pt also c/o hematuria, constipation and external hemorrhoids.      Name of GYN Physician: none    POB:  G0    Pgyn: +fibroids, + ovarian cysts, + breast mass s/p breast biopsies x3 (all benign), pap always wnl, mammogram always wnl, pt reports last pap and mammogram last  year  PMH: HTN, CAD, CHF s/p AICD/PPM, gastritis, asthma  PSH:   - abd myomectomy (reports 5 fibroids removed) @[facility] 2001 and R ovarian cystectomy at that time  - 1x R breast biopsy, 2x L breast biopsy (all benign)  - 3x hemorrhoidectomies  - [facility]        Hospital Meds:   MEDICATIONS  (STANDING):  aspirin enteric coated 81 milliGRAM(s) Oral daily  carvedilol 25 milliGRAM(s) Oral every 12 hours  docusate sodium 100 milliGRAM(s) Oral three times a day  influenza   Vaccine 0.5 milliLiter(s) IntraMuscular once  pantoprazole    Tablet 40 milliGRAM(s) Oral before breakfast  polyethylene glycol 3350 17 Gram(s) Oral daily  QUEtiapine 50 milliGRAM(s) Oral at bedtime  QUEtiapine 50 milliGRAM(s) Oral daily  sacubitril 24 mG/valsartan 26 mG 1 Tablet(s) Oral two times a day  senna 2 Tablet(s) Oral at bedtime  sodium chloride 0.45%. 1000 milliLiter(s) (50 mL/Hr) IV Continuous <Continuous>    MEDICATIONS  (PRN):  morphine  - Injectable 4 milliGRAM(s) IV Push every 4 hours PRN Severe Pain (7 - 10)  morphine  - Injectable 2 milliGRAM(s) IV Push every 4 hours PRN Moderate Pain (4 - 6)  ondansetron Injectable 4 milliGRAM(s) IV Push every 8 hours PRN Nausea and/or Vomiting  simethicone 80 milliGRAM(s) Chew every 6 hours PRN Gas      Social History:  Denies smoking use, drug use, alcohol use.     Vital Signs Last 24 Hrs  T(C): 37.1 (30 Nov 2018 12:48), Max: 37.2 (29 Nov 2018 21:43)  T(F): 98.8 (30 Nov 2018 12:48), Max: 99 (29 Nov 2018 21:43)  HR: 89 (30 Nov 2018 17:03) (66 - 89)  BP: 118/68 (30 Nov 2018 17:03) (108/64 - 151/86)  BP(mean): --  RR: 18 (30 Nov 2018 12:48) (17 - 20)  SpO2: 99% (30 Nov 2018 12:48) (99% - 100%)    Physical Exam:   General: sitting comfortably in bed, NAD   CV: RR, S1S2 present, no m/r/g  Lungs: CTA b/l  Abd: soft, generalized tenderness, + tympanic, + distended   :  No bleeding on pad.    External labia wnl.  Bimanual exam with no cervical motion tenderness, abdomen very tense and distended, unable to palpate uterine fundus due to patient discomfort  Speculum Exam: No active bleeding from os. Physiologic discharge.    Ext: non-tender b/l, no edema     LABS:                            9.6    12.75 )-----------( 452      ( 30 Nov 2018 09:15 )             28.8     11-30    142  |  101  |  9   ----------------------------<  140<H>  3.0<L>   |  27  |  0.89    Ca    9.8      30 Nov 2018 09:15  Phos  2.7     11-30  Mg     2.0     11-30    TPro  6.7  /  Alb  3.6  /  TBili  1.6<H>  /  DBili  0.3<H>  /  AST  229<H>  /  ALT  321<H>  /  AlkPhos  169<H>  11-30    I&O's Detail    29 Nov 2018 07:01  -  30 Nov 2018 07:00  --------------------------------------------------------  IN:    IV PiggyBack: 750 mL    Oral Fluid: 240 mL    sodium chloride 0.45%.: 200 mL  Total IN: 1190 mL    OUT:    Voided: 320 mL  Total OUT: 320 mL    Total NET: 870 mL        PT/INR - ( 29 Nov 2018 11:58 )   PT: 12.3 SEC;   INR: 1.10          PTT - ( 29 Nov 2018 11:58 )  PTT:30.1 SEC MIKAEL MCCULLOUGH  57y  Female [id number]    HPI:  58 yo F G0 LMP age 48 with PMH HTN, CAD, CHF s/p AICD/PPM, gastritis, presenting from home with acute worsening of generalized/R>L sided abdominal pain that radiates to the back.  Patient reports the pain is severe, associated with nausea and vomiting and decreased PO intake.  She reports two recent visits to [facility], where she was diagnosed with a UTI and completed a course of both Bactrim and Levaquin, states her symptoms never completely resolved.   Pt reports decreased po intake over past 2 weeks due to abd pain and dizziness.    Pt reports due to her abd pain she was taking as many as 21 tylenol tabs for 2 weeks.  Pt found to have acute hepatitis- most likely iatrogenic.     During workup for abd pain, CTAP performed which revealed mild R hydronephrosis.  Gyn consult called to evaluate uterus as source of mechanical compression causing hydronephrosis. Pt reports a history of uterine fibroids. LMP at age 48 however experiences spotting after intercourse.  Pt also c/o hematuria, constipation and external hemorrhoids.  Denies urinary symptoms, dyspareunia      Name of GYN Physician: none    POB:  G0    Pgyn: +fibroids, + ovarian cysts, + breast mass s/p breast biopsies x3 (all benign), pap always wnl, mammogram always wnl, pt reports last pap and mammogram last  year  PMH: HTN, CAD, CHF s/p AICD/PPM, gastritis, asthma  PSH:   - abd myomectomy (reports 5 fibroids removed) @[facility] 2001 and R ovarian cystectomy at that time  - 1x R breast biopsy, 2x L breast biopsy (all benign)  - 3x hemorrhoidectomies  - [facility]        Hospital Meds:   MEDICATIONS  (STANDING):  aspirin enteric coated 81 milliGRAM(s) Oral daily  carvedilol 25 milliGRAM(s) Oral every 12 hours  docusate sodium 100 milliGRAM(s) Oral three times a day  influenza   Vaccine 0.5 milliLiter(s) IntraMuscular once  pantoprazole    Tablet 40 milliGRAM(s) Oral before breakfast  polyethylene glycol 3350 17 Gram(s) Oral daily  QUEtiapine 50 milliGRAM(s) Oral at bedtime  QUEtiapine 50 milliGRAM(s) Oral daily  sacubitril 24 mG/valsartan 26 mG 1 Tablet(s) Oral two times a day  senna 2 Tablet(s) Oral at bedtime  sodium chloride 0.45%. 1000 milliLiter(s) (50 mL/Hr) IV Continuous <Continuous>    MEDICATIONS  (PRN):  morphine  - Injectable 4 milliGRAM(s) IV Push every 4 hours PRN Severe Pain (7 - 10)  morphine  - Injectable 2 milliGRAM(s) IV Push every 4 hours PRN Moderate Pain (4 - 6)  ondansetron Injectable 4 milliGRAM(s) IV Push every 8 hours PRN Nausea and/or Vomiting  simethicone 80 milliGRAM(s) Chew every 6 hours PRN Gas      Social History:  Denies smoking use, drug use, alcohol use.     Vital Signs Last 24 Hrs  T(C): 37.1 (30 Nov 2018 12:48), Max: 37.2 (29 Nov 2018 21:43)  T(F): 98.8 (30 Nov 2018 12:48), Max: 99 (29 Nov 2018 21:43)  HR: 89 (30 Nov 2018 17:03) (66 - 89)  BP: 118/68 (30 Nov 2018 17:03) (108/64 - 151/86)  BP(mean): --  RR: 18 (30 Nov 2018 12:48) (17 - 20)  SpO2: 99% (30 Nov 2018 12:48) (99% - 100%)    Physical Exam:   General: sitting comfortably in bed, NAD   CV: RR, S1S2 present, no m/r/g  Lungs: CTA b/l  Abd: soft, generalized tenderness, + tympanic, + distended   :  No bleeding on pad.    External labia wnl.  Bimanual exam with no cervical motion tenderness, abdomen very tense and distended, unable to palpate uterine fundus due to patient discomfort  Speculum Exam: No active bleeding from os. Physiologic discharge.    Ext: non-tender b/l, no edema     LABS:                            9.6    12.75 )-----------( 452      ( 30 Nov 2018 09:15 )             28.8     11-30    142  |  101  |  9   ----------------------------<  140<H>  3.0<L>   |  27  |  0.89    Ca    9.8      30 Nov 2018 09:15  Phos  2.7     11-30  Mg     2.0     11-30    TPro  6.7  /  Alb  3.6  /  TBili  1.6<H>  /  DBili  0.3<H>  /  AST  229<H>  /  ALT  321<H>  /  AlkPhos  169<H>  11-30    I&O's Detail    29 Nov 2018 07:01  -  30 Nov 2018 07:00  --------------------------------------------------------  IN:    IV PiggyBack: 750 mL    Oral Fluid: 240 mL    sodium chloride 0.45%.: 200 mL  Total IN: 1190 mL    OUT:    Voided: 320 mL  Total OUT: 320 mL    Total NET: 870 mL        PT/INR - ( 29 Nov 2018 11:58 )   PT: 12.3 SEC;   INR: 1.10          PTT - ( 29 Nov 2018 11:58 )  PTT:30.1 SEC

## 2018-11-30 NOTE — PROGRESS NOTE ADULT - PROBLEM SELECTOR PLAN 3
Uro c/s given R hydro: no intervention at this time  - abx as above  - PVR bladder scan: 175ml retained  - pending urine Cx  - will consider Gyn workup for cause of obstruction as per Uro Unclear etiology: hydronephrosis vs PUD given NSAID use vs hepatitis   - lipase wnl, pain has been getting worse throughout the week with her increasing NSAID/tylenol doses   (iSTOP Reference #: [id number] negative for Rx)  - ondansetron PRN nausea/vomiting  - simethicone PRN  - morphine PRN mod to severe pain

## 2018-11-30 NOTE — PROGRESS NOTE ADULT - PROBLEM SELECTOR PLAN 5
Pt reports severe pain and poor PO intake: likely orthostatic  - fall precautions  - will check orthostatics   - EP consult for device interrogation

## 2018-11-30 NOTE — PROGRESS NOTE ADULT - PROBLEM SELECTOR PLAN 1
Pt has been taking increasing doses of Tylenol extra strength throughout the week, up to 10.5g 1d PTA. LFTs now downtrending  - toxicology c/s: started on N-acetylcysteinate protocol   - negative acetaminophen and salicylate levels, however likely >24hrs since ingestion  - hepatitis panel negative, no ethanol detected  - trend LFTs, avoid hepatotoxic medications

## 2018-11-30 NOTE — PROGRESS NOTE ADULT - PROBLEM SELECTOR PLAN 2
unimpressive UTI, was treated with bactrim and levaquin without relief.  Do not think UTI is causing symptoms, will d/c cipro as was already on Levaquin without relief. Uro c/s given R hydro: no intervention at this time  - abx as above  - PVR bladder scan: 175ml retained  - pending urine Cx  - GYN c/s as per urology for fibroids causing R hydro: transvaginal U/S as per gyn

## 2018-11-30 NOTE — CONSULT NOTE ADULT - ASSESSMENT
56 yo F G0 LMP age 48 with PMH HTN, CAD, CHF s/p AICD/PPM, gastritis discovered to have mild R hydronephrosis on CTAP.  Gyn consult called due to history of fibroids. 58 yo F G0 LMP age 48 with PMH HTN, CAD, CHF s/p AICD/PPM, gastritis discovered to have mild R hydronephrosis on CTAP.

## 2018-11-30 NOTE — PROGRESS NOTE ADULT - PROBLEM SELECTOR PLAN 4
Unclear etiology: hydronephrosis vs PUD given NSAID use vs hepatitis   - lipase wnl, pain has been getting worse throughout the week with her increasing NSAID/tylenol doses   (iSTOP Reference #: [id number] negative for Rx)  - ondansetron PRN nausea/vomiting  - simethicone PRN  - morphine PRN mod to severe pain unimpressive UTI, was treated with bactrim and Levaquin without relief.  Do not think UTI is causing symptoms, will d/c cipro as was already on Levaquin without relief.

## 2018-11-30 NOTE — CONSULT NOTE ADULT - PROBLEM SELECTOR RECOMMENDATION 9
- CTAP images reviewed with radiology- reports uterus is slightly enlarged and very unlikely to be a source of her hydronephrosis  - recommend TVUS to better evaluate uterus and adnexa   - establish care with outpt gyn for paps, mammograms    Dominga Rm PGY2  to be seen with attending - CTAP images reviewed with radiology- reports uterus is slightly enlarged and very unlikely to be a source mechanical compression that is causing hydronephrosis  - recommend TVUS to better evaluate uterus and adnexa   - establish care with outpt gyn for paps, mammograms    Dominga Rm PGY2  to be seen with attending - CTAP images reviewed with radiology- reports uterus is slightly enlarged and very unlikely to be a source mechanical compression that is causing hydronephrosis  - establish care with outpt gyn for paps, mammograms    12/2: TVUS images reviewed with attending.  uterus normal sized.  overall TVUS normal in appearance, very unlikely to be a cause of the hydronephrosis.  No acute gyn intervention needed.  No gyn issues.    Dominga Rm PGY2  seen w Dr Melo

## 2018-11-30 NOTE — CONSULT NOTE ADULT - PROBLEM SELECTOR PROBLEM 1
Acetaminophen overdose of undetermined intent, initial encounter
Hydronephrosis of right kidney
Hydronephrosis, unspecified hydronephrosis type

## 2018-12-01 LAB
ALBUMIN SERPL ELPH-MCNC: 3.3 G/DL — SIGNIFICANT CHANGE UP (ref 3.3–5)
ALP SERPL-CCNC: 137 U/L — HIGH (ref 40–120)
ALT FLD-CCNC: 190 U/L — HIGH (ref 4–33)
APPEARANCE UR: CLEAR — SIGNIFICANT CHANGE UP
AST SERPL-CCNC: 73 U/L — HIGH (ref 4–32)
BACTERIA # UR AUTO: SIGNIFICANT CHANGE UP
BILIRUB SERPL-MCNC: 1.7 MG/DL — HIGH (ref 0.2–1.2)
BILIRUB UR-MCNC: NEGATIVE — SIGNIFICANT CHANGE UP
BLOOD UR QL VISUAL: NEGATIVE — SIGNIFICANT CHANGE UP
BUN SERPL-MCNC: 12 MG/DL — SIGNIFICANT CHANGE UP (ref 7–23)
CALCIUM SERPL-MCNC: 9.6 MG/DL — SIGNIFICANT CHANGE UP (ref 8.4–10.5)
CHLORIDE SERPL-SCNC: 105 MMOL/L — SIGNIFICANT CHANGE UP (ref 98–107)
CO2 SERPL-SCNC: 26 MMOL/L — SIGNIFICANT CHANGE UP (ref 22–31)
COLOR SPEC: YELLOW — SIGNIFICANT CHANGE UP
CREAT SERPL-MCNC: 0.92 MG/DL — SIGNIFICANT CHANGE UP (ref 0.5–1.3)
GLUCOSE SERPL-MCNC: 90 MG/DL — SIGNIFICANT CHANGE UP (ref 70–99)
GLUCOSE UR-MCNC: NEGATIVE — SIGNIFICANT CHANGE UP
HCT VFR BLD CALC: 25.8 % — LOW (ref 34.5–45)
HGB BLD-MCNC: 8.3 G/DL — LOW (ref 11.5–15.5)
HYALINE CASTS # UR AUTO: NEGATIVE — SIGNIFICANT CHANGE UP
KETONES UR-MCNC: NEGATIVE — SIGNIFICANT CHANGE UP
LEUKOCYTE ESTERASE UR-ACNC: SIGNIFICANT CHANGE UP
MAGNESIUM SERPL-MCNC: 1.8 MG/DL — SIGNIFICANT CHANGE UP (ref 1.6–2.6)
MCHC RBC-ENTMCNC: 30.3 PG — SIGNIFICANT CHANGE UP (ref 27–34)
MCHC RBC-ENTMCNC: 32.2 % — SIGNIFICANT CHANGE UP (ref 32–36)
MCV RBC AUTO: 94.2 FL — SIGNIFICANT CHANGE UP (ref 80–100)
NITRITE UR-MCNC: NEGATIVE — SIGNIFICANT CHANGE UP
NRBC # FLD: 0 — SIGNIFICANT CHANGE UP
PH UR: 7 — SIGNIFICANT CHANGE UP (ref 5–8)
PHOSPHATE SERPL-MCNC: 2.8 MG/DL — SIGNIFICANT CHANGE UP (ref 2.5–4.5)
PLATELET # BLD AUTO: 411 K/UL — HIGH (ref 150–400)
PMV BLD: 9.5 FL — SIGNIFICANT CHANGE UP (ref 7–13)
POTASSIUM SERPL-MCNC: 3.3 MMOL/L — LOW (ref 3.5–5.3)
POTASSIUM SERPL-SCNC: 3.3 MMOL/L — LOW (ref 3.5–5.3)
PROT SERPL-MCNC: 6.2 G/DL — SIGNIFICANT CHANGE UP (ref 6–8.3)
PROT UR-MCNC: 10 — SIGNIFICANT CHANGE UP
RBC # BLD: 2.74 M/UL — LOW (ref 3.8–5.2)
RBC # FLD: 13.2 % — SIGNIFICANT CHANGE UP (ref 10.3–14.5)
RBC CASTS # UR COMP ASSIST: SIGNIFICANT CHANGE UP (ref 0–?)
SODIUM SERPL-SCNC: 143 MMOL/L — SIGNIFICANT CHANGE UP (ref 135–145)
SP GR SPEC: 1.02 — SIGNIFICANT CHANGE UP (ref 1–1.04)
SQUAMOUS # UR AUTO: SIGNIFICANT CHANGE UP
UROBILINOGEN FLD QL: NORMAL — SIGNIFICANT CHANGE UP
WBC # BLD: 10.7 K/UL — HIGH (ref 3.8–10.5)
WBC # FLD AUTO: 10.7 K/UL — HIGH (ref 3.8–10.5)
WBC UR QL: >50 — HIGH (ref 0–?)

## 2018-12-01 PROCEDURE — 76830 TRANSVAGINAL US NON-OB: CPT | Mod: 26

## 2018-12-01 PROCEDURE — 99233 SBSQ HOSP IP/OBS HIGH 50: CPT | Mod: GC

## 2018-12-01 RX ORDER — POTASSIUM CHLORIDE 20 MEQ
40 PACKET (EA) ORAL EVERY 4 HOURS
Qty: 0 | Refills: 0 | Status: COMPLETED | OUTPATIENT
Start: 2018-12-01 | End: 2018-12-01

## 2018-12-01 RX ADMIN — CARVEDILOL PHOSPHATE 25 MILLIGRAM(S): 80 CAPSULE, EXTENDED RELEASE ORAL at 17:34

## 2018-12-01 RX ADMIN — Medication 100 MILLIGRAM(S): at 12:52

## 2018-12-01 RX ADMIN — MORPHINE SULFATE 4 MILLIGRAM(S): 50 CAPSULE, EXTENDED RELEASE ORAL at 14:53

## 2018-12-01 RX ADMIN — Medication 81 MILLIGRAM(S): at 12:52

## 2018-12-01 RX ADMIN — Medication 100 MILLIGRAM(S): at 22:43

## 2018-12-01 RX ADMIN — Medication 40 MILLIEQUIVALENT(S): at 12:52

## 2018-12-01 RX ADMIN — SACUBITRIL AND VALSARTAN 1 TABLET(S): 24; 26 TABLET, FILM COATED ORAL at 05:59

## 2018-12-01 RX ADMIN — MORPHINE SULFATE 4 MILLIGRAM(S): 50 CAPSULE, EXTENDED RELEASE ORAL at 06:30

## 2018-12-01 RX ADMIN — MORPHINE SULFATE 4 MILLIGRAM(S): 50 CAPSULE, EXTENDED RELEASE ORAL at 19:00

## 2018-12-01 RX ADMIN — Medication 100 MILLIGRAM(S): at 05:59

## 2018-12-01 RX ADMIN — Medication 40 MILLIEQUIVALENT(S): at 17:34

## 2018-12-01 RX ADMIN — MORPHINE SULFATE 4 MILLIGRAM(S): 50 CAPSULE, EXTENDED RELEASE ORAL at 23:23

## 2018-12-01 RX ADMIN — SACUBITRIL AND VALSARTAN 1 TABLET(S): 24; 26 TABLET, FILM COATED ORAL at 17:34

## 2018-12-01 RX ADMIN — MORPHINE SULFATE 4 MILLIGRAM(S): 50 CAPSULE, EXTENDED RELEASE ORAL at 15:10

## 2018-12-01 RX ADMIN — PANTOPRAZOLE SODIUM 40 MILLIGRAM(S): 20 TABLET, DELAYED RELEASE ORAL at 06:03

## 2018-12-01 RX ADMIN — POLYETHYLENE GLYCOL 3350 17 GRAM(S): 17 POWDER, FOR SOLUTION ORAL at 12:52

## 2018-12-01 RX ADMIN — CARVEDILOL PHOSPHATE 25 MILLIGRAM(S): 80 CAPSULE, EXTENDED RELEASE ORAL at 05:59

## 2018-12-01 RX ADMIN — QUETIAPINE FUMARATE 50 MILLIGRAM(S): 200 TABLET, FILM COATED ORAL at 12:52

## 2018-12-01 RX ADMIN — SENNA PLUS 2 TABLET(S): 8.6 TABLET ORAL at 22:43

## 2018-12-01 RX ADMIN — MORPHINE SULFATE 4 MILLIGRAM(S): 50 CAPSULE, EXTENDED RELEASE ORAL at 06:14

## 2018-12-01 RX ADMIN — QUETIAPINE FUMARATE 50 MILLIGRAM(S): 200 TABLET, FILM COATED ORAL at 22:43

## 2018-12-01 RX ADMIN — MORPHINE SULFATE 4 MILLIGRAM(S): 50 CAPSULE, EXTENDED RELEASE ORAL at 23:08

## 2018-12-01 RX ADMIN — MORPHINE SULFATE 4 MILLIGRAM(S): 50 CAPSULE, EXTENDED RELEASE ORAL at 19:15

## 2018-12-01 NOTE — PROGRESS NOTE ADULT - PROBLEM SELECTOR PLAN 4
Pt reports severe pain and poor PO intake: likely orthostatic  - fall precautions  - orthostatics   - EP consult for device interrogation

## 2018-12-01 NOTE — PROGRESS NOTE ADULT - PROBLEM SELECTOR PLAN 3
- Multifactorial given R hydronephrosis vs PUD given NSAID use vs hepatitis vs. fibroids  - Management/plan as above   (iSTOP Reference #: [id number] negative for Rx)  - ondansetron PRN nausea/vomiting  - simethicone PRN  - morphine PRN mod to severe pain

## 2018-12-01 NOTE — PROGRESS NOTE ADULT - ASSESSMENT
57 year old female with a medical history significant for HTN, CAD, CHF, s/p PPM/AICD, gastritis and fibroids presenting to the ED with a 1 month history of worsening abdominal pain, nausea and vomiting, s/p a couple courses of antibiotics (Bactrim and Levaquin), with Righ Hydronephrosis without visible stone   -Obgyn following and recommend Transvaginal ultrasound.  - No urgent indication for additional urological intervention/workup at this time.  - Patient may follow up with Dr. Yoo at [phone] once discharged.  - Please contact urology for additional questions or concerns.

## 2018-12-01 NOTE — PROGRESS NOTE ADULT - SUBJECTIVE AND OBJECTIVE BOX
Subjective:  Patient seen and examined. Feels well.  Obgyn recommended Transvaginal ultrasound to evaluate uterus.    Objectives:  T(C): 37.4 (12-01-18 @ 13:40), Max: 37.4 (12-01-18 @ 13:40)  HR: 87 (12-01-18 @ 13:40) (81 - 87)  BP: 119/75 (12-01-18 @ 13:40) (115/73 - 119/75)  RR: 17 (12-01-18 @ 13:40) (17 - 18)  SpO2: 100% (12-01-18 @ 13:40) (99% - 100%)  Wt(kg): --      Physcial Exam  GENERAL: NAD, well-developed  ABDOMEN: Soft, Nontender, Nondistended  PSYCH: AAOx3    LABS:                        8.3    10.70 )-----------( 411      ( 01 Dec 2018 06:25 )             25.8     12-01    143  |  105  |  12  ----------------------------<  90  3.3<L>   |  26  |  0.92    Ca    9.6      01 Dec 2018 06:25  Phos  2.8     12-01  Mg     1.8     12-01    TPro  6.2  /  Alb  3.3  /  TBili  1.7<H>  /  DBili  x   /  AST  73<H>  /  ALT  190<H>  /  AlkPhos  137<H>  12-01    CAPILLARY BLOOD GLUCOSE          CAPILLARY BLOOD GLUCOSE

## 2018-12-01 NOTE — PROGRESS NOTE ADULT - PROBLEM SELECTOR PLAN 6
Will contact PMD for baseline. Pt reports vaginal spotting w/ intercourse and occasional rectal bleeding with wiping (known internal hemorrhoids) Pt reports she is up to date on pap and colonoscopy  - trend H/H  - bowel regimen  - o/p f/u with Gyn and GI

## 2018-12-01 NOTE — PROGRESS NOTE ADULT - ASSESSMENT
57-year-old female with HTN, CAD, CHF s/p AICD/PPM, gastritis, presenting from home with acute worsening of generalized abdominal pain that radiates to the back found to have recurrent UTI and hydronephrosis, now with acute hepatitis

## 2018-12-01 NOTE — PROGRESS NOTE ADULT - SUBJECTIVE AND OBJECTIVE BOX
Patient is a 57y old  Female who presents with a chief complaint of abdominal pain (30 Nov 2018 17:41)      SUBJECTIVE / OVERNIGHT EVENTS: No acute events over night. Pt seen and examined. No reported  fevers, chills, n/v, CP, palpitations, SOB, abdominal pain or melena.       MEDICATIONS  (STANDING):  aspirin enteric coated 81 milliGRAM(s) Oral daily  carvedilol 25 milliGRAM(s) Oral every 12 hours  docusate sodium 100 milliGRAM(s) Oral three times a day  influenza   Vaccine 0.5 milliLiter(s) IntraMuscular once  pantoprazole    Tablet 40 milliGRAM(s) Oral before breakfast  polyethylene glycol 3350 17 Gram(s) Oral daily  QUEtiapine 50 milliGRAM(s) Oral at bedtime  QUEtiapine 50 milliGRAM(s) Oral daily  sacubitril 24 mG/valsartan 26 mG 1 Tablet(s) Oral two times a day  senna 2 Tablet(s) Oral at bedtime  sodium chloride 0.45%. 1000 milliLiter(s) (50 mL/Hr) IV Continuous <Continuous>    MEDICATIONS  (PRN):  morphine  - Injectable 4 milliGRAM(s) IV Push every 4 hours PRN Severe Pain (7 - 10)  morphine  - Injectable 2 milliGRAM(s) IV Push every 4 hours PRN Moderate Pain (4 - 6)  ondansetron Injectable 4 milliGRAM(s) IV Push every 8 hours PRN Nausea and/or Vomiting  simethicone 80 milliGRAM(s) Chew every 6 hours PRN Gas      CAPILLARY BLOOD GLUCOSE        I&O's Summary      T(C): 37.3 (12-01-18 @ 05:19), Max: 37.4 (11-30-18 @ 21:30)  HR: 81 (12-01-18 @ 05:19) (66 - 89)  BP: 115/73 (12-01-18 @ 05:19) (106/66 - 123/82)  RR: 18 (12-01-18 @ 05:19) (16 - 18)  SpO2: 99% (12-01-18 @ 05:19) (97% - 99%)    PHYSICAL EXAM:  GENERAL: NAD, well-developed  HEAD:  Atraumatic, Normocephalic  EYES: EOMI, PERRLA, conjunctiva and sclera clear  NECK: Supple, No JVD  CHEST/LUNG: Clear to auscultation bilaterally; No wheeze  HEART: Regular rate and rhythm; No murmurs, rubs, or gallops  ABDOMEN: Soft, TTP RLQ- no rebounding/guarding, Nondistended; Bowel sounds present  EXTREMITIES:  2+ Peripheral Pulses, No clubbing, cyanosis, or edema  PSYCH: AAOx3  NEUROLOGY: non-focal  SKIN: No rashes or lesions    LABS:                        8.3    10.70 )-----------( 411      ( 01 Dec 2018 06:25 )             25.8     WBC Trend: 10.70<--, 12.75<--, 10.40<--  12-01    143  |  105  |  12  ----------------------------<  90  3.3<L>   |  26  |  0.92    Ca    9.6      01 Dec 2018 06:25  Phos  2.8     12-01  Mg     1.8     12-01    TPro  6.2  /  Alb  3.3  /  TBili  1.7<H>  /  DBili  x   /  AST  73<H>  /  ALT  190<H>  /  AlkPhos  137<H>  12-01    Creatinine Trend: 0.92<--, 0.89<--, 0.86<--, 0.87<--, 0.99<--  PT/INR - ( 29 Nov 2018 11:58 )   PT: 12.3 SEC;   INR: 1.10          PTT - ( 29 Nov 2018 11:58 )  PTT:30.1 SEC            RADIOLOGY & ADDITIONAL TESTS:    Imaging Personally Reviewed:    Consultant(s) Notes Reviewed:      Care Discussed with Consultants/Other Providers:

## 2018-12-01 NOTE — PROGRESS NOTE ADULT - PROBLEM SELECTOR PLAN 2
- Urology c/s given R hydro: no intervention at this time  - GYN c/s as per urology for fibroids causing R hydro: transvaginal U/S pending  - c/w Morphine prn pain

## 2018-12-02 LAB
ALBUMIN SERPL ELPH-MCNC: 3.6 G/DL — SIGNIFICANT CHANGE UP (ref 3.3–5)
ALP SERPL-CCNC: 116 U/L — SIGNIFICANT CHANGE UP (ref 40–120)
ALT FLD-CCNC: 133 U/L — HIGH (ref 4–33)
AST SERPL-CCNC: 33 U/L — HIGH (ref 4–32)
BILIRUB SERPL-MCNC: 0.4 MG/DL — SIGNIFICANT CHANGE UP (ref 0.2–1.2)
BUN SERPL-MCNC: 12 MG/DL — SIGNIFICANT CHANGE UP (ref 7–23)
CALCIUM SERPL-MCNC: 9.9 MG/DL — SIGNIFICANT CHANGE UP (ref 8.4–10.5)
CHLORIDE SERPL-SCNC: 103 MMOL/L — SIGNIFICANT CHANGE UP (ref 98–107)
CO2 SERPL-SCNC: 26 MMOL/L — SIGNIFICANT CHANGE UP (ref 22–31)
CREAT SERPL-MCNC: 1.02 MG/DL — SIGNIFICANT CHANGE UP (ref 0.5–1.3)
FERRITIN SERPL-MCNC: 881.4 NG/ML — HIGH (ref 15–150)
GLUCOSE SERPL-MCNC: 85 MG/DL — SIGNIFICANT CHANGE UP (ref 70–99)
HCT VFR BLD CALC: 24.8 % — LOW (ref 34.5–45)
HGB BLD-MCNC: 7.8 G/DL — LOW (ref 11.5–15.5)
IRON SATN MFR SERPL: 204 UG/DL — SIGNIFICANT CHANGE UP (ref 140–530)
IRON SATN MFR SERPL: 35 UG/DL — SIGNIFICANT CHANGE UP (ref 30–160)
LDH SERPL L TO P-CCNC: 292 U/L — HIGH (ref 135–225)
MAGNESIUM SERPL-MCNC: 1.9 MG/DL — SIGNIFICANT CHANGE UP (ref 1.6–2.6)
MCHC RBC-ENTMCNC: 29.8 PG — SIGNIFICANT CHANGE UP (ref 27–34)
MCHC RBC-ENTMCNC: 31.5 % — LOW (ref 32–36)
MCV RBC AUTO: 94.7 FL — SIGNIFICANT CHANGE UP (ref 80–100)
NRBC # FLD: 0 — SIGNIFICANT CHANGE UP
OB PNL STL: NEGATIVE — SIGNIFICANT CHANGE UP
PHOSPHATE SERPL-MCNC: 2.4 MG/DL — LOW (ref 2.5–4.5)
PLATELET # BLD AUTO: 461 K/UL — HIGH (ref 150–400)
PMV BLD: 9.8 FL — SIGNIFICANT CHANGE UP (ref 7–13)
POTASSIUM SERPL-MCNC: 3.9 MMOL/L — SIGNIFICANT CHANGE UP (ref 3.5–5.3)
POTASSIUM SERPL-SCNC: 3.9 MMOL/L — SIGNIFICANT CHANGE UP (ref 3.5–5.3)
PROT SERPL-MCNC: 6.5 G/DL — SIGNIFICANT CHANGE UP (ref 6–8.3)
RBC # BLD: 2.62 M/UL — LOW (ref 3.8–5.2)
RBC # FLD: 13.4 % — SIGNIFICANT CHANGE UP (ref 10.3–14.5)
RETICS #: 43 K/UL — SIGNIFICANT CHANGE UP (ref 25–125)
RETICS/RBC NFR: 1.6 % — SIGNIFICANT CHANGE UP (ref 0.5–2.5)
SODIUM SERPL-SCNC: 141 MMOL/L — SIGNIFICANT CHANGE UP (ref 135–145)
TSH SERPL-MCNC: 4.07 UIU/ML — SIGNIFICANT CHANGE UP (ref 0.27–4.2)
UIBC SERPL-MCNC: 169.4 UG/DL — SIGNIFICANT CHANGE UP (ref 110–370)
VIT B12 SERPL-MCNC: 596 PG/ML — SIGNIFICANT CHANGE UP (ref 200–900)
WBC # BLD: 13.55 K/UL — HIGH (ref 3.8–10.5)
WBC # FLD AUTO: 13.55 K/UL — HIGH (ref 3.8–10.5)

## 2018-12-02 PROCEDURE — 99233 SBSQ HOSP IP/OBS HIGH 50: CPT | Mod: GC

## 2018-12-02 RX ORDER — POTASSIUM PHOSPHATE, MONOBASIC POTASSIUM PHOSPHATE, DIBASIC 236; 224 MG/ML; MG/ML
15 INJECTION, SOLUTION INTRAVENOUS ONCE
Qty: 0 | Refills: 0 | Status: COMPLETED | OUTPATIENT
Start: 2018-12-02 | End: 2018-12-02

## 2018-12-02 RX ORDER — MAGNESIUM HYDROXIDE 400 MG/1
30 TABLET, CHEWABLE ORAL DAILY
Qty: 0 | Refills: 0 | Status: DISCONTINUED | OUTPATIENT
Start: 2018-12-02 | End: 2018-12-04

## 2018-12-02 RX ORDER — POLYETHYLENE GLYCOL 3350 17 G/17G
17 POWDER, FOR SOLUTION ORAL
Qty: 0 | Refills: 0 | Status: DISCONTINUED | OUTPATIENT
Start: 2018-12-02 | End: 2018-12-04

## 2018-12-02 RX ORDER — SODIUM,POTASSIUM PHOSPHATES 278-250MG
1 POWDER IN PACKET (EA) ORAL
Qty: 0 | Refills: 0 | Status: COMPLETED | OUTPATIENT
Start: 2018-12-02 | End: 2018-12-02

## 2018-12-02 RX ADMIN — SENNA PLUS 2 TABLET(S): 8.6 TABLET ORAL at 21:50

## 2018-12-02 RX ADMIN — SACUBITRIL AND VALSARTAN 1 TABLET(S): 24; 26 TABLET, FILM COATED ORAL at 06:13

## 2018-12-02 RX ADMIN — CARVEDILOL PHOSPHATE 25 MILLIGRAM(S): 80 CAPSULE, EXTENDED RELEASE ORAL at 17:04

## 2018-12-02 RX ADMIN — Medication 100 MILLIGRAM(S): at 21:50

## 2018-12-02 RX ADMIN — SACUBITRIL AND VALSARTAN 1 TABLET(S): 24; 26 TABLET, FILM COATED ORAL at 17:04

## 2018-12-02 RX ADMIN — MORPHINE SULFATE 4 MILLIGRAM(S): 50 CAPSULE, EXTENDED RELEASE ORAL at 21:48

## 2018-12-02 RX ADMIN — MAGNESIUM HYDROXIDE 30 MILLILITER(S): 400 TABLET, CHEWABLE ORAL at 13:16

## 2018-12-02 RX ADMIN — MORPHINE SULFATE 4 MILLIGRAM(S): 50 CAPSULE, EXTENDED RELEASE ORAL at 04:40

## 2018-12-02 RX ADMIN — CARVEDILOL PHOSPHATE 25 MILLIGRAM(S): 80 CAPSULE, EXTENDED RELEASE ORAL at 06:13

## 2018-12-02 RX ADMIN — QUETIAPINE FUMARATE 50 MILLIGRAM(S): 200 TABLET, FILM COATED ORAL at 21:49

## 2018-12-02 RX ADMIN — MORPHINE SULFATE 4 MILLIGRAM(S): 50 CAPSULE, EXTENDED RELEASE ORAL at 15:31

## 2018-12-02 RX ADMIN — Medication 10 MILLIGRAM(S): at 18:41

## 2018-12-02 RX ADMIN — MORPHINE SULFATE 4 MILLIGRAM(S): 50 CAPSULE, EXTENDED RELEASE ORAL at 15:16

## 2018-12-02 RX ADMIN — PANTOPRAZOLE SODIUM 40 MILLIGRAM(S): 20 TABLET, DELAYED RELEASE ORAL at 06:13

## 2018-12-02 RX ADMIN — Medication 1 TABLET(S): at 12:33

## 2018-12-02 RX ADMIN — MORPHINE SULFATE 4 MILLIGRAM(S): 50 CAPSULE, EXTENDED RELEASE ORAL at 04:25

## 2018-12-02 RX ADMIN — MORPHINE SULFATE 4 MILLIGRAM(S): 50 CAPSULE, EXTENDED RELEASE ORAL at 09:05

## 2018-12-02 RX ADMIN — QUETIAPINE FUMARATE 50 MILLIGRAM(S): 200 TABLET, FILM COATED ORAL at 12:32

## 2018-12-02 RX ADMIN — POLYETHYLENE GLYCOL 3350 17 GRAM(S): 17 POWDER, FOR SOLUTION ORAL at 12:32

## 2018-12-02 RX ADMIN — MORPHINE SULFATE 4 MILLIGRAM(S): 50 CAPSULE, EXTENDED RELEASE ORAL at 22:05

## 2018-12-02 RX ADMIN — Medication 100 MILLIGRAM(S): at 06:13

## 2018-12-02 RX ADMIN — Medication 81 MILLIGRAM(S): at 12:32

## 2018-12-02 RX ADMIN — POLYETHYLENE GLYCOL 3350 17 GRAM(S): 17 POWDER, FOR SOLUTION ORAL at 17:04

## 2018-12-02 RX ADMIN — Medication 100 MILLIGRAM(S): at 13:17

## 2018-12-02 RX ADMIN — MORPHINE SULFATE 4 MILLIGRAM(S): 50 CAPSULE, EXTENDED RELEASE ORAL at 09:21

## 2018-12-02 NOTE — PROGRESS NOTE ADULT - PROBLEM SELECTOR PLAN 3
- Multifactorial given R hydronephrosis vs PUD given NSAID use vs hepatitis vs. fibroids  - Management/plan as above   (iSTOP Reference #: [id number] negative for Rx)  - ondansetron PRN nausea/vomiting  - simethicone PRN  - morphine PRN mod to severe pain

## 2018-12-02 NOTE — PROGRESS NOTE ADULT - PROBLEM SELECTOR PLAN 7
s/p PPM/AICD, per patient due for f/u visit with cardiologist  c/w Entresto, carvedilol, aspirin; not on any diuretics at present  outpatient f/u with cardiologist  Strict I/Os, daily weights  EP consult: device battery healthy  TTE: EF 57%

## 2018-12-02 NOTE — PROGRESS NOTE ADULT - PROBLEM SELECTOR PLAN 1
Pt has been taking increasing doses of Tylenol extra strength throughout the week, up to 10.5g 1d PTA. LFTs now downtrending  - toxicology c/s: completed N-acetylcysteinate protocol   - negative acetaminophen and salicylate levels, however likely >24hrs since ingestion  - hepatitis panel negative, no ethanol detected  - trend LFTs, avoid hepatotoxic medications

## 2018-12-02 NOTE — PROGRESS NOTE ADULT - PROBLEM SELECTOR PLAN 2
- Urology c/s given R hydro: no intervention at this time  - GYN c/s as per urology for fibroids causing R hydro: transvaginal U/S read pending  - c/w Morphine prn pain - Urology c/s given R hydro: no intervention at this time  - GYN c/s as per urology for fibroids causing R hydro: transvaginal U/S: Enlarged uterus with mild endometrial thickening.  - c/w Morphine prn pain

## 2018-12-03 LAB
ALBUMIN SERPL ELPH-MCNC: 3.6 G/DL — SIGNIFICANT CHANGE UP (ref 3.3–5)
ALP SERPL-CCNC: 121 U/L — HIGH (ref 40–120)
ALT FLD-CCNC: 95 U/L — HIGH (ref 4–33)
APPEARANCE UR: CLEAR — SIGNIFICANT CHANGE UP
AST SERPL-CCNC: 24 U/L — SIGNIFICANT CHANGE UP (ref 4–32)
BACTERIA # UR AUTO: NEGATIVE — SIGNIFICANT CHANGE UP
BILIRUB SERPL-MCNC: 0.3 MG/DL — SIGNIFICANT CHANGE UP (ref 0.2–1.2)
BILIRUB UR-MCNC: NEGATIVE — SIGNIFICANT CHANGE UP
BLD GP AB SCN SERPL QL: NEGATIVE — SIGNIFICANT CHANGE UP
BLOOD UR QL VISUAL: NEGATIVE — SIGNIFICANT CHANGE UP
BUN SERPL-MCNC: 10 MG/DL — SIGNIFICANT CHANGE UP (ref 7–23)
CALCIUM SERPL-MCNC: 9.9 MG/DL — SIGNIFICANT CHANGE UP (ref 8.4–10.5)
CHLORIDE SERPL-SCNC: 103 MMOL/L — SIGNIFICANT CHANGE UP (ref 98–107)
CO2 SERPL-SCNC: 25 MMOL/L — SIGNIFICANT CHANGE UP (ref 22–31)
COLOR SPEC: SIGNIFICANT CHANGE UP
CREAT SERPL-MCNC: 1.09 MG/DL — SIGNIFICANT CHANGE UP (ref 0.5–1.3)
GLUCOSE SERPL-MCNC: 110 MG/DL — HIGH (ref 70–99)
GLUCOSE UR-MCNC: NEGATIVE — SIGNIFICANT CHANGE UP
HCT VFR BLD CALC: 25 % — LOW (ref 34.5–45)
HGB BLD-MCNC: 8 G/DL — LOW (ref 11.5–15.5)
HYALINE CASTS # UR AUTO: NEGATIVE — SIGNIFICANT CHANGE UP
KETONES UR-MCNC: NEGATIVE — SIGNIFICANT CHANGE UP
LEUKOCYTE ESTERASE UR-ACNC: SIGNIFICANT CHANGE UP
MAGNESIUM SERPL-MCNC: 2.3 MG/DL — SIGNIFICANT CHANGE UP (ref 1.6–2.6)
MCHC RBC-ENTMCNC: 30.5 PG — SIGNIFICANT CHANGE UP (ref 27–34)
MCHC RBC-ENTMCNC: 32 % — SIGNIFICANT CHANGE UP (ref 32–36)
MCV RBC AUTO: 95.4 FL — SIGNIFICANT CHANGE UP (ref 80–100)
NITRITE UR-MCNC: NEGATIVE — SIGNIFICANT CHANGE UP
NRBC # FLD: 0 — SIGNIFICANT CHANGE UP
PH UR: 8 — SIGNIFICANT CHANGE UP (ref 5–8)
PHOSPHATE SERPL-MCNC: 3.6 MG/DL — SIGNIFICANT CHANGE UP (ref 2.5–4.5)
PLATELET # BLD AUTO: 450 K/UL — HIGH (ref 150–400)
PMV BLD: 9.3 FL — SIGNIFICANT CHANGE UP (ref 7–13)
POTASSIUM SERPL-MCNC: 4.4 MMOL/L — SIGNIFICANT CHANGE UP (ref 3.5–5.3)
POTASSIUM SERPL-SCNC: 4.4 MMOL/L — SIGNIFICANT CHANGE UP (ref 3.5–5.3)
PROT SERPL-MCNC: 6.7 G/DL — SIGNIFICANT CHANGE UP (ref 6–8.3)
PROT UR-MCNC: 10 — SIGNIFICANT CHANGE UP
RBC # BLD: 2.62 M/UL — LOW (ref 3.8–5.2)
RBC # FLD: 13.2 % — SIGNIFICANT CHANGE UP (ref 10.3–14.5)
RBC CASTS # UR COMP ASSIST: SIGNIFICANT CHANGE UP (ref 0–?)
RH IG SCN BLD-IMP: POSITIVE — SIGNIFICANT CHANGE UP
SODIUM SERPL-SCNC: 139 MMOL/L — SIGNIFICANT CHANGE UP (ref 135–145)
SP GR SPEC: 1.01 — SIGNIFICANT CHANGE UP (ref 1–1.04)
SQUAMOUS # UR AUTO: SIGNIFICANT CHANGE UP
UROBILINOGEN FLD QL: NORMAL — SIGNIFICANT CHANGE UP
WBC # BLD: 14.69 K/UL — HIGH (ref 3.8–10.5)
WBC # FLD AUTO: 14.69 K/UL — HIGH (ref 3.8–10.5)
WBC UR QL: HIGH (ref 0–?)

## 2018-12-03 PROCEDURE — 99232 SBSQ HOSP IP/OBS MODERATE 35: CPT | Mod: GC

## 2018-12-03 RX ORDER — MORPHINE SULFATE 50 MG/1
10 CAPSULE, EXTENDED RELEASE ORAL EVERY 4 HOURS
Qty: 0 | Refills: 0 | Status: DISCONTINUED | OUTPATIENT
Start: 2018-12-03 | End: 2018-12-03

## 2018-12-03 RX ORDER — IBUPROFEN 200 MG
600 TABLET ORAL EVERY 6 HOURS
Qty: 0 | Refills: 0 | Status: DISCONTINUED | OUTPATIENT
Start: 2018-12-03 | End: 2018-12-04

## 2018-12-03 RX ORDER — MORPHINE SULFATE 50 MG/1
5 CAPSULE, EXTENDED RELEASE ORAL EVERY 4 HOURS
Qty: 0 | Refills: 0 | Status: DISCONTINUED | OUTPATIENT
Start: 2018-12-03 | End: 2018-12-03

## 2018-12-03 RX ADMIN — Medication 600 MILLIGRAM(S): at 22:00

## 2018-12-03 RX ADMIN — Medication 100 MILLIGRAM(S): at 21:11

## 2018-12-03 RX ADMIN — SENNA PLUS 2 TABLET(S): 8.6 TABLET ORAL at 21:11

## 2018-12-03 RX ADMIN — Medication 600 MILLIGRAM(S): at 21:09

## 2018-12-03 RX ADMIN — MORPHINE SULFATE 10 MILLIGRAM(S): 50 CAPSULE, EXTENDED RELEASE ORAL at 16:09

## 2018-12-03 RX ADMIN — SIMETHICONE 80 MILLIGRAM(S): 80 TABLET, CHEWABLE ORAL at 09:02

## 2018-12-03 RX ADMIN — Medication 100 MILLIGRAM(S): at 15:31

## 2018-12-03 RX ADMIN — QUETIAPINE FUMARATE 50 MILLIGRAM(S): 200 TABLET, FILM COATED ORAL at 12:25

## 2018-12-03 RX ADMIN — MORPHINE SULFATE 4 MILLIGRAM(S): 50 CAPSULE, EXTENDED RELEASE ORAL at 09:56

## 2018-12-03 RX ADMIN — SACUBITRIL AND VALSARTAN 1 TABLET(S): 24; 26 TABLET, FILM COATED ORAL at 05:35

## 2018-12-03 RX ADMIN — SACUBITRIL AND VALSARTAN 1 TABLET(S): 24; 26 TABLET, FILM COATED ORAL at 17:37

## 2018-12-03 RX ADMIN — CARVEDILOL PHOSPHATE 25 MILLIGRAM(S): 80 CAPSULE, EXTENDED RELEASE ORAL at 17:37

## 2018-12-03 RX ADMIN — Medication 81 MILLIGRAM(S): at 12:25

## 2018-12-03 RX ADMIN — MORPHINE SULFATE 4 MILLIGRAM(S): 50 CAPSULE, EXTENDED RELEASE ORAL at 09:41

## 2018-12-03 RX ADMIN — CARVEDILOL PHOSPHATE 25 MILLIGRAM(S): 80 CAPSULE, EXTENDED RELEASE ORAL at 05:34

## 2018-12-03 RX ADMIN — POLYETHYLENE GLYCOL 3350 17 GRAM(S): 17 POWDER, FOR SOLUTION ORAL at 17:37

## 2018-12-03 RX ADMIN — MORPHINE SULFATE 4 MILLIGRAM(S): 50 CAPSULE, EXTENDED RELEASE ORAL at 05:45

## 2018-12-03 RX ADMIN — POLYETHYLENE GLYCOL 3350 17 GRAM(S): 17 POWDER, FOR SOLUTION ORAL at 05:34

## 2018-12-03 RX ADMIN — QUETIAPINE FUMARATE 50 MILLIGRAM(S): 200 TABLET, FILM COATED ORAL at 21:11

## 2018-12-03 RX ADMIN — MORPHINE SULFATE 10 MILLIGRAM(S): 50 CAPSULE, EXTENDED RELEASE ORAL at 15:27

## 2018-12-03 RX ADMIN — MORPHINE SULFATE 4 MILLIGRAM(S): 50 CAPSULE, EXTENDED RELEASE ORAL at 05:33

## 2018-12-03 RX ADMIN — Medication 100 MILLIGRAM(S): at 05:35

## 2018-12-03 RX ADMIN — PANTOPRAZOLE SODIUM 40 MILLIGRAM(S): 20 TABLET, DELAYED RELEASE ORAL at 07:25

## 2018-12-03 NOTE — PROGRESS NOTE ADULT - PROBLEM SELECTOR PLAN 3
- Multifactorial given R hydronephrosis vs PUD given NSAID use vs hepatitis vs. fibroids  - Management/plan as above   (iSTOP Reference #: [id number] negative for Rx)  - ondansetron PRN nausea/vomiting  - simethicone PRN  - morphine PRN mod to severe pain  - PPI given Hx gastritis and significant NSAID intake

## 2018-12-03 NOTE — PROGRESS NOTE ADULT - PROBLEM SELECTOR PLAN 1
Resolving. Pt has been taking increasing doses of Tylenol extra strength throughout the week, up to 10.5g 1d PTA. LFTs now downtrending  - toxicology c/s: completed N-acetylcysteinate protocol   - negative acetaminophen and salicylate levels, however likely >24hrs since ingestion  - hepatitis panel negative, no ethanol detected  - trend LFTs, avoid hepatotoxic medications  - Pt taking similar amount of ibuprofen and has Hx of gastritis

## 2018-12-03 NOTE — PROGRESS NOTE ADULT - PROBLEM SELECTOR PLAN 2
- Urology c/s given R hydro: no intervention at this time  - GYN c/s as per urology for fibroids causing R hydro: transvaginal U/S read pending  - c/w Morphine prn pain - Urology c/s given mild R hydro: no intervention at this time  - GYN c/s as per urology for fibroids causing R hydro. S/p transvaginal U/S personally reviewed and shows Enlarged uterus with mild endometrial thickening. No acute intervention per GYN.   - c/w Morphine prn pain

## 2018-12-03 NOTE — PROGRESS NOTE ADULT - PROBLEM SELECTOR PLAN 4
Pt reports severe pain and poor PO intake: likely orthostatic  - fall precautions  - orthostatics   - EP consult for device interrogation: no events Resolved. Pt reports severe pain and poor PO intake: likely orthostatic  - fall precautions  - EP consult for device interrogation: no events

## 2018-12-03 NOTE — PROGRESS NOTE ADULT - SUBJECTIVE AND OBJECTIVE BOX
Authored by Dr Charles Rosas [phone] [facility] / [id number] [address]    Patient is a 57y old  Female who presents with a chief complaint of abdominal pain (02 Dec 2018 07:27)    SUBJECTIVE / OVERNIGHT EVENTS: No acute events overnight  This morning pt again reports her pain is much improved, but still present. She reports BM last night for the first time in 5 days. Her pain is worse with urination, passing stool, or movement.     MEDICATIONS  (STANDING):  aspirin enteric coated 81 milliGRAM(s) Oral daily  carvedilol 25 milliGRAM(s) Oral every 12 hours  docusate sodium 100 milliGRAM(s) Oral three times a day  influenza   Vaccine 0.5 milliLiter(s) IntraMuscular once  pantoprazole    Tablet 40 milliGRAM(s) Oral before breakfast  polyethylene glycol 3350 17 Gram(s) Oral two times a day  QUEtiapine 50 milliGRAM(s) Oral at bedtime  QUEtiapine 50 milliGRAM(s) Oral daily  sacubitril 24 mG/valsartan 26 mG 1 Tablet(s) Oral two times a day  senna 2 Tablet(s) Oral at bedtime  sodium chloride 0.45%. 1000 milliLiter(s) (50 mL/Hr) IV Continuous <Continuous>    MEDICATIONS  (PRN):  magnesium hydroxide Suspension 30 milliLiter(s) Oral daily PRN Constipation  morphine  - Injectable 4 milliGRAM(s) IV Push every 4 hours PRN Severe Pain (7 - 10)  morphine  - Injectable 2 milliGRAM(s) IV Push every 4 hours PRN Moderate Pain (4 - 6)  ondansetron Injectable 4 milliGRAM(s) IV Push every 8 hours PRN Nausea and/or Vomiting  simethicone 80 milliGRAM(s) Chew every 6 hours PRN Gas      Vital Signs Last 24 Hrs  T(C): 37.4 (03 Dec 2018 05:14), Max: 37.6 (02 Dec 2018 21:57)  T(F): 99.3 (03 Dec 2018 05:14), Max: 99.6 (02 Dec 2018 21:57)  HR: 82 (03 Dec 2018 05:40) (70 - 88)  BP: 107/66 (03 Dec 2018 05:40) (106/65 - 125/69)  BP(mean): --  RR: 17 (03 Dec 2018 05:40) (17 - 19)  SpO2: 97% (03 Dec 2018 05:40) (97% - 100%)  CAPILLARY BLOOD GLUCOSE        I&O's Summary    02 Dec 2018 07:01  -  03 Dec 2018 07:00  --------------------------------------------------------  IN: 720 mL / OUT: 975 mL / NET: -255 mL        PHYSICAL EXAM  GENERAL: NAD, well-developed  HEAD:  Atraumatic, Normocephalic  EYES: EOMI, PERRLA, conjunctiva and sclera clear  NECK: Supple, No JVD  CHEST/LUNG: Clear to auscultation bilaterally; No wheeze  HEART: Regular rate and rhythm; No murmurs, rubs, or gallops  ABDOMEN: Soft, +diffuse TTP, worst in RLQ- no rebounding/guarding, +mildly distended; Bowel sounds present  EXTREMITIES:  2+ Peripheral Pulses, No clubbing, cyanosis, or edema  PSYCH: AAOx3  NEUROLOGY: non-focal  SKIN: No rashes or lesions    LABS:                        8.0    14.69 )-----------( 450      ( 03 Dec 2018 06:30 )             25.0     12-    139  |  103  |  10  ----------------------------<  110<H>  4.4   |  25  |  1.09    Ca    9.9      03 Dec 2018 06:30  Phos  3.6     12-  Mg     2.3     12-    TPro  6.7  /  Alb  3.6  /  TBili  0.3  /  DBili  x   /  AST  24  /  ALT  95<H>  /  AlkPhos  121<H>  12-03          Urinalysis Basic - ( 01 Dec 2018 22:23 )    Color: YELLOW / Appearance: CLEAR / S.016 / pH: 7.0  Gluc: NEGATIVE / Ketone: NEGATIVE  / Bili: NEGATIVE / Urobili: NORMAL   Blood: NEGATIVE / Protein: 10 / Nitrite: NEGATIVE   Leuk Esterase: LARGE / RBC: 0-2 / WBC >50   Sq Epi: FEW / Non Sq Epi: x / Bacteria: FEW          RADIOLOGY & ADDITIONAL TESTS:    Imaging Personally Reviewed: pelvic US  Consultant(s) Notes Reviewed:    Care Discussed with Consultants/Other Providers:

## 2018-12-03 NOTE — PROGRESS NOTE ADULT - PROBLEM SELECTOR PLAN 7
s/p PPM/AICD, per patient due for f/u visit with cardiologist  c/w Entresto, carvedilol, aspirin; not on any diuretics at present  outpatient f/u with cardiologist  Strict I/Os, daily weights  EP consult: device battery healthy  TTE: EF 57% HFref. Currently euvolemic. S/p TTE on 11/30  showing grossly normal LV function (however, endocardium not well visualized).   s/p PPM/AICD, per patient due for f/u visit with cardiologist  c/w Entresto, carvedilol, aspirin; not on any diuretics at present  outpatient f/u with cardiologist  Strict I/Os, daily weights  EP consult: device battery healthy  TTE: EF 57%

## 2018-12-04 VITALS
DIASTOLIC BLOOD PRESSURE: 68 MMHG | TEMPERATURE: 98 F | RESPIRATION RATE: 18 BRPM | OXYGEN SATURATION: 100 % | SYSTOLIC BLOOD PRESSURE: 110 MMHG | HEART RATE: 78 BPM

## 2018-12-04 LAB
ALBUMIN SERPL ELPH-MCNC: 3.6 G/DL — SIGNIFICANT CHANGE UP (ref 3.3–5)
ALP SERPL-CCNC: 110 U/L — SIGNIFICANT CHANGE UP (ref 40–120)
ALT FLD-CCNC: 71 U/L — HIGH (ref 4–33)
AST SERPL-CCNC: 16 U/L — SIGNIFICANT CHANGE UP (ref 4–32)
BACTERIA UR CULT: SIGNIFICANT CHANGE UP
BASOPHILS # BLD AUTO: 0.07 K/UL — SIGNIFICANT CHANGE UP (ref 0–0.2)
BASOPHILS NFR BLD AUTO: 0.6 % — SIGNIFICANT CHANGE UP (ref 0–2)
BILIRUB SERPL-MCNC: 0.2 MG/DL — SIGNIFICANT CHANGE UP (ref 0.2–1.2)
BUN SERPL-MCNC: 12 MG/DL — SIGNIFICANT CHANGE UP (ref 7–23)
BUN SERPL-MCNC: 12 MG/DL — SIGNIFICANT CHANGE UP (ref 7–23)
CALCIUM SERPL-MCNC: 9.9 MG/DL — SIGNIFICANT CHANGE UP (ref 8.4–10.5)
CALCIUM SERPL-MCNC: 9.9 MG/DL — SIGNIFICANT CHANGE UP (ref 8.4–10.5)
CHLORIDE SERPL-SCNC: 102 MMOL/L — SIGNIFICANT CHANGE UP (ref 98–107)
CHLORIDE SERPL-SCNC: 102 MMOL/L — SIGNIFICANT CHANGE UP (ref 98–107)
CO2 SERPL-SCNC: 25 MMOL/L — SIGNIFICANT CHANGE UP (ref 22–31)
CO2 SERPL-SCNC: 25 MMOL/L — SIGNIFICANT CHANGE UP (ref 22–31)
CREAT SERPL-MCNC: 1.19 MG/DL — SIGNIFICANT CHANGE UP (ref 0.5–1.3)
CREAT SERPL-MCNC: 1.19 MG/DL — SIGNIFICANT CHANGE UP (ref 0.5–1.3)
EOSINOPHIL # BLD AUTO: 0.94 K/UL — HIGH (ref 0–0.5)
EOSINOPHIL NFR BLD AUTO: 7.9 % — HIGH (ref 0–6)
GLUCOSE SERPL-MCNC: 114 MG/DL — HIGH (ref 70–99)
GLUCOSE SERPL-MCNC: 114 MG/DL — HIGH (ref 70–99)
HCT VFR BLD CALC: 25.2 % — LOW (ref 34.5–45)
HGB BLD-MCNC: 8.1 G/DL — LOW (ref 11.5–15.5)
IMM GRANULOCYTES # BLD AUTO: 0.17 # — SIGNIFICANT CHANGE UP
IMM GRANULOCYTES NFR BLD AUTO: 1.4 % — SIGNIFICANT CHANGE UP (ref 0–1.5)
LYMPHOCYTES # BLD AUTO: 18.4 % — SIGNIFICANT CHANGE UP (ref 13–44)
LYMPHOCYTES # BLD AUTO: 2.19 K/UL — SIGNIFICANT CHANGE UP (ref 1–3.3)
MAGNESIUM SERPL-MCNC: 2.5 MG/DL — SIGNIFICANT CHANGE UP (ref 1.6–2.6)
MAGNESIUM SERPL-MCNC: 2.5 MG/DL — SIGNIFICANT CHANGE UP (ref 1.6–2.6)
MCHC RBC-ENTMCNC: 30.3 PG — SIGNIFICANT CHANGE UP (ref 27–34)
MCHC RBC-ENTMCNC: 32.1 % — SIGNIFICANT CHANGE UP (ref 32–36)
MCV RBC AUTO: 94.4 FL — SIGNIFICANT CHANGE UP (ref 80–100)
MONOCYTES # BLD AUTO: 1.45 K/UL — HIGH (ref 0–0.9)
MONOCYTES NFR BLD AUTO: 12.2 % — SIGNIFICANT CHANGE UP (ref 2–14)
NEUTROPHILS # BLD AUTO: 7.07 K/UL — SIGNIFICANT CHANGE UP (ref 1.8–7.4)
NEUTROPHILS NFR BLD AUTO: 59.5 % — SIGNIFICANT CHANGE UP (ref 43–77)
NRBC # FLD: 0 — SIGNIFICANT CHANGE UP
PHOSPHATE SERPL-MCNC: 4.8 MG/DL — HIGH (ref 2.5–4.5)
PHOSPHATE SERPL-MCNC: 4.8 MG/DL — HIGH (ref 2.5–4.5)
PLATELET # BLD AUTO: 465 K/UL — HIGH (ref 150–400)
PMV BLD: 9.7 FL — SIGNIFICANT CHANGE UP (ref 7–13)
POTASSIUM SERPL-MCNC: 3.8 MMOL/L — SIGNIFICANT CHANGE UP (ref 3.5–5.3)
POTASSIUM SERPL-MCNC: 3.8 MMOL/L — SIGNIFICANT CHANGE UP (ref 3.5–5.3)
POTASSIUM SERPL-SCNC: 3.8 MMOL/L — SIGNIFICANT CHANGE UP (ref 3.5–5.3)
POTASSIUM SERPL-SCNC: 3.8 MMOL/L — SIGNIFICANT CHANGE UP (ref 3.5–5.3)
PROT SERPL-MCNC: 6.9 G/DL — SIGNIFICANT CHANGE UP (ref 6–8.3)
RBC # BLD: 2.67 M/UL — LOW (ref 3.8–5.2)
RBC # FLD: 13.4 % — SIGNIFICANT CHANGE UP (ref 10.3–14.5)
RH IG SCN BLD-IMP: POSITIVE — SIGNIFICANT CHANGE UP
SODIUM SERPL-SCNC: 139 MMOL/L — SIGNIFICANT CHANGE UP (ref 135–145)
SODIUM SERPL-SCNC: 139 MMOL/L — SIGNIFICANT CHANGE UP (ref 135–145)
SPECIMEN SOURCE: SIGNIFICANT CHANGE UP
WBC # BLD: 11.89 K/UL — HIGH (ref 3.8–10.5)
WBC # FLD AUTO: 11.89 K/UL — HIGH (ref 3.8–10.5)

## 2018-12-04 PROCEDURE — 99239 HOSP IP/OBS DSCHRG MGMT >30: CPT

## 2018-12-04 RX ORDER — POLYETHYLENE GLYCOL 3350 17 G/17G
17 POWDER, FOR SOLUTION ORAL
Qty: 0 | Refills: 0 | COMMUNITY
Start: 2018-12-04

## 2018-12-04 RX ORDER — IBUPROFEN 200 MG
1 TABLET ORAL
Qty: 0 | Refills: 0 | COMMUNITY
Start: 2018-12-04

## 2018-12-04 RX ORDER — SIMETHICONE 80 MG/1
1 TABLET, CHEWABLE ORAL
Qty: 12 | Refills: 0 | OUTPATIENT
Start: 2018-12-04 | End: 2018-12-06

## 2018-12-04 RX ORDER — DOCUSATE SODIUM 100 MG
1 CAPSULE ORAL
Qty: 0 | Refills: 0 | COMMUNITY
Start: 2018-12-04

## 2018-12-04 RX ORDER — SIMETHICONE 80 MG/1
1 TABLET, CHEWABLE ORAL
Qty: 0 | Refills: 0 | COMMUNITY
Start: 2018-12-04

## 2018-12-04 RX ORDER — MAGNESIUM HYDROXIDE 400 MG/1
30 TABLET, CHEWABLE ORAL
Qty: 0 | Refills: 0 | COMMUNITY
Start: 2018-12-04

## 2018-12-04 RX ORDER — QUETIAPINE FUMARATE 200 MG/1
1 TABLET, FILM COATED ORAL
Qty: 0 | Refills: 0 | COMMUNITY

## 2018-12-04 RX ORDER — PANTOPRAZOLE SODIUM 20 MG/1
1 TABLET, DELAYED RELEASE ORAL
Qty: 30 | Refills: 0 | OUTPATIENT
Start: 2018-12-04 | End: 2019-01-02

## 2018-12-04 RX ORDER — LIDOCAINE 4 G/100G
1 CREAM TOPICAL DAILY
Qty: 0 | Refills: 0 | Status: DISCONTINUED | OUTPATIENT
Start: 2018-12-04 | End: 2018-12-04

## 2018-12-04 RX ORDER — SENNA PLUS 8.6 MG/1
2 TABLET ORAL
Qty: 0 | Refills: 0 | COMMUNITY
Start: 2018-12-04

## 2018-12-04 RX ORDER — IBUPROFEN 200 MG
1 TABLET ORAL
Qty: 12 | Refills: 0 | OUTPATIENT
Start: 2018-12-04 | End: 2018-12-06

## 2018-12-04 RX ADMIN — POLYETHYLENE GLYCOL 3350 17 GRAM(S): 17 POWDER, FOR SOLUTION ORAL at 06:14

## 2018-12-04 RX ADMIN — Medication 100 MILLIGRAM(S): at 06:14

## 2018-12-04 RX ADMIN — Medication 600 MILLIGRAM(S): at 10:55

## 2018-12-04 RX ADMIN — SACUBITRIL AND VALSARTAN 1 TABLET(S): 24; 26 TABLET, FILM COATED ORAL at 06:15

## 2018-12-04 RX ADMIN — Medication 81 MILLIGRAM(S): at 13:02

## 2018-12-04 RX ADMIN — QUETIAPINE FUMARATE 50 MILLIGRAM(S): 200 TABLET, FILM COATED ORAL at 13:02

## 2018-12-04 RX ADMIN — LIDOCAINE 1 PATCH: 4 CREAM TOPICAL at 13:01

## 2018-12-04 RX ADMIN — PANTOPRAZOLE SODIUM 40 MILLIGRAM(S): 20 TABLET, DELAYED RELEASE ORAL at 06:15

## 2018-12-04 RX ADMIN — Medication 100 MILLIGRAM(S): at 13:02

## 2018-12-04 RX ADMIN — CARVEDILOL PHOSPHATE 25 MILLIGRAM(S): 80 CAPSULE, EXTENDED RELEASE ORAL at 06:14

## 2018-12-04 RX ADMIN — Medication 600 MILLIGRAM(S): at 10:20

## 2018-12-04 NOTE — PROGRESS NOTE ADULT - PROBLEM SELECTOR PLAN 2
- Urology c/s given mild R hydro: no intervention at this time  - GYN c/s as per urology for fibroids causing R hydro. S/p transvaginal U/S personally reviewed and shows Enlarged uterus with mild endometrial thickening. No acute intervention per GYN.

## 2018-12-04 NOTE — PROGRESS NOTE ADULT - SUBJECTIVE AND OBJECTIVE BOX
Authored by Dr Charles Rosas [phone] [facility] / [id number] [address]    Patient is a 57y old  Female who presents with a chief complaint of abdominal pain (03 Dec 2018 08:25)    SUBJECTIVE / OVERNIGHT EVENTS: No acute events overnight   This morning pt reports he pain is much improved and she has not required pain medications. Plan for dispo to home with close PCP followup    MEDICATIONS  (STANDING):  aspirin enteric coated 81 milliGRAM(s) Oral daily  carvedilol 25 milliGRAM(s) Oral every 12 hours  docusate sodium 100 milliGRAM(s) Oral three times a day  influenza   Vaccine 0.5 milliLiter(s) IntraMuscular once  pantoprazole    Tablet 40 milliGRAM(s) Oral before breakfast  polyethylene glycol 3350 17 Gram(s) Oral two times a day  QUEtiapine 50 milliGRAM(s) Oral at bedtime  QUEtiapine 50 milliGRAM(s) Oral daily  sacubitril 24 mG/valsartan 26 mG 1 Tablet(s) Oral two times a day  senna 2 Tablet(s) Oral at bedtime  sodium chloride 0.45%. 1000 milliLiter(s) (50 mL/Hr) IV Continuous <Continuous>    MEDICATIONS  (PRN):  ibuprofen  Tablet. 600 milliGRAM(s) Oral every 6 hours PRN Moderate Pain (4 - 6), Severe Pain (7 - 10)  magnesium hydroxide Suspension 30 milliLiter(s) Oral daily PRN Constipation  ondansetron Injectable 4 milliGRAM(s) IV Push every 8 hours PRN Nausea and/or Vomiting  simethicone 80 milliGRAM(s) Chew every 6 hours PRN Gas      Vital Signs Last 24 Hrs  T(C): 36.6 (04 Dec 2018 05:17), Max: 37.4 (03 Dec 2018 21:51)  T(F): 97.8 (04 Dec 2018 05:17), Max: 99.3 (03 Dec 2018 21:51)  HR: 69 (04 Dec 2018 05:17) (63 - 87)  BP: 100/53 (04 Dec 2018 05:17) (100/53 - 110/76)  BP(mean): --  RR: 18 (04 Dec 2018 05:17) (17 - 18)  SpO2: 99% (04 Dec 2018 05:17) (94% - 100%)  CAPILLARY BLOOD GLUCOSE        I&O's Summary    03 Dec 2018 07:01  -  04 Dec 2018 07:00  --------------------------------------------------------  IN: 600 mL / OUT: 625 mL / NET: -25 mL        PHYSICAL EXAM  GENERAL: NAD, well-developed, resting comfortably in bed  HEAD:  Atraumatic, Normocephalic  EYES: EOMI, PERRLA, conjunctiva and sclera clear  NECK: Supple, No JVD  CHEST/LUNG: Clear to auscultation bilaterally; No wheeze  HEART: Regular rate and rhythm; No murmurs, rubs, or gallops  ABDOMEN: Soft, +mild diffuse TTP, worst in RLQ- no rebounding/guarding, +mildly distended; Bowel sounds present  EXTREMITIES:  2+ Peripheral Pulses, No clubbing, cyanosis, or edema  PSYCH: AAOx3  NEUROLOGY: non-focal  SKIN: No rashes or lesions    LABS:                        8.1    11.89 )-----------( 465      ( 04 Dec 2018 06:35 )             25.2     12-04    139  |  102  |  12  ----------------------------<  114<H>  3.8   |  25  |  1.19    Ca    9.9      04 Dec 2018 06:35  Phos  4.8     12-04  Mg     2.5     12-04    TPro  6.9  /  Alb  3.6  /  TBili  0.2  /  DBili  x   /  AST  16  /  ALT  71<H>  /  AlkPhos  110  12-04          Urinalysis Basic - ( 03 Dec 2018 13:09 )    Color: LIGHT YELLOW / Appearance: CLEAR / S.012 / pH: 8.0  Gluc: NEGATIVE / Ketone: NEGATIVE  / Bili: NEGATIVE / Urobili: NORMAL   Blood: NEGATIVE / Protein: 10 / Nitrite: NEGATIVE   Leuk Esterase: LARGE / RBC: 3-5 / WBC 26-50   Sq Epi: FEW / Non Sq Epi: x / Bacteria: NEGATIVE          RADIOLOGY & ADDITIONAL TESTS:    Imaging Personally Reviewed: no new imaging  Consultant(s) Notes Reviewed:  no new consults  Care Discussed with Consultants/Other Providers:

## 2018-12-04 NOTE — PROGRESS NOTE ADULT - PROBLEM SELECTOR PLAN 7
HFref. Currently euvolemic. S/p TTE on 11/30  showing grossly normal LV function (however, endocardium not well visualized).   s/p PPM/AICD, per patient due for f/u visit with cardiologist  c/w Entresto, carvedilol, aspirin; not on any diuretics at present  outpatient f/u with cardiologist  Strict I/Os, daily weights  EP consult: device battery healthy  TTE: EF 57%

## 2018-12-04 NOTE — PROGRESS NOTE ADULT - PROBLEM SELECTOR PLAN 5
c/w aspirin, unclear why no longer on statin given patient's significant family history  clarify w/patient's o/p providers  - hold statin given acute hepatitis  - continue to attempt to obtain records c/w aspirin,   - hold statin given acute hepatitis

## 2018-12-04 NOTE — PROGRESS NOTE ADULT - PROVIDER SPECIALTY LIST ADULT
Internal Medicine
Urology
Urology
Internal Medicine

## 2018-12-04 NOTE — PROGRESS NOTE ADULT - REASON FOR ADMISSION
abdominal pain

## 2018-12-04 NOTE — PROGRESS NOTE ADULT - PROBLEM SELECTOR PLAN 6
Will contact PMD for baseline. Pt reports vaginal spotting w/ intercourse and occasional rectal bleeding with wiping (known internal hemorrhoids) Pt reports she is up to date on pap and colonoscopy  - trend H/H: Iron studies (chronic disease vs chronic inflammatory)  - bowel regimen  - o/p f/u with Gyn and GI Pt reports vaginal spotting w/ intercourse and occasional rectal bleeding with wiping (known internal hemorrhoids) Pt reports she is up to date on pap and colonoscopy  - trend H/H: Iron studies (chronic disease vs chronic inflammatory)  - bowel regimen  - o/p f/u with Gyn and GI

## 2018-12-04 NOTE — PROGRESS NOTE ADULT - ASSESSMENT
57-year-old female with HTN, CAD, CHF s/p AICD/PPM, gastritis, presenting from home with acute worsening of generalized abdominal pain that radiates to the back found to have recurrent UTI and hydronephrosis. Resolving hepatitis, pending dispo to home,

## 2018-12-04 NOTE — PROGRESS NOTE ADULT - ATTENDING COMMENTS
Patient seen and examined. Case discussed with Dr. Rosas and the rest of the team. I agree with the findings and the plan above.
56 yo F w/ PMHx as stated above a/w acute on chronic abdominal pain of unclear etiology c/b hepatitis likely 2/2 her Tylenol use s/p NAC protocol. Initially w/ increasing leukocytosis but now decreasing today    Patient w/ intermittent abdominal pain. She reports having increased pain after having a soft BM. Pain is now "cooling" down.      Work up thus far unrevealing. Pt w/ mild R hydro and mild endometrial thickening , however these findings does not correlate w/ the extent of patients pain.   -would limit opiod use as much as possible. Morphine now dcd. Would avoid Tylenol at this time as her liver continues to heal. C/w Motrin PRN but should not exceed daily max.   -Noted leukocytosis. Pt w/ no local symptoms except for having to strain intermittently to urinate which she states is new but improved/resolved today. Repeat u/a + for LE and WBCx. Pending urine cx. Given pt remains afebrile w/o symptoms typical of UTI/ her symptoms w/ previous UTI would monitor off of abx at this time as low suspicion for UTI at this time.    -C/w conservative management w/ PPI, simethicone and stool softeners.    D/c planning for today. Had extensive conversation with patient explaining inpatient w/u and findings and f/u. Explained that she will need very close f/u w/ her PMD and pain management. She was advised not to use Tylenol at this time. She may be able to resume this in the future but at a very limited dose. She was also advised to use NSAIDs but must not exceed the daily max. Spoke w/ pt also about her mood and she denies current SI, depression, she denies that the amount of Tylenol she took was related to harming herself and was for pain. Will arrange for PMD f/u In the next 1-2 days. Further details are also outlined in discharge document. Spent 38 min in discharge planning and coordination.
58 yo F w/ PMHx as stated above a/w acute on chronic abdominal pain of unclear etiology c/b hepatitis likely 2/2 her Tylenol use s/p NAC protocol. Now w/ increasing leukocytosis.     Work up thus far unrevealing. Pt w/ mild R hydro and mild endometrial thickening , however these findings does not correlate w/ the extent of patients pain.   -would limit opiod use as much as possible. Would dc iv morphine. Would avoid Tylenol at this time as her liver continues to heal. Will start Motrin PRN.   -Noted leukocytosis. Pt w/ no local symptoms except for having to strain to urinate which she states is new. Would repeat u/a and obtain urine cx. Would also follow up w/ a post void bladder scan as pts abdomin is distended. If no improvement in WBC and urine cx + will consider abx.   -C/w conservative management w/ PPI, simethicone and stool softeners.
Patient seen and examined this morning. Case discussed with Dr. De La Torre, I agree with the findings and plan as above.    Drop in Hb noted. Patient states that she noticed blood in her urine. UA ordered as well as FOBT.  No complaints of chest pain, palpitations or shortness of breath.
Patient seen and examined this morning in the morning. Reports that her pain is a lot better than yesterday. Currently on on N-acetylcysteinate protocol. Continue to closely monitor LFTs    #Acute hepatitis  -likely in setting of tylenol toxicity  -toxicology called yesterday, NAC protocol initiated, will follow toxicology recommendations  -monitor LFTs    #Abdominal pain  -the abdominal pain predates tylenol and still need to find causation of pain.  Pain is in RLQ radiating in groin on same side as right sided hydroureter.  -given side of pain, likely cause of pain.  -appreciate urology recs, once acute issue of acute hepatitis and tylenol toxicity resolves, will need to address this with urology, per their recs will Place Gyn eval  -Obtain FOBT as patient is anemic and also self medicated with superfluous amounts of ibuprofen     rest as above.
PAtient seen and evaluated with team at bedside. Patient in excrutiating abdominal pain causing to inject high levels of tylenol. This AM with jump in LFTS    #ACute hepatitis  -likely in setting of tylenol toxicity  -toxicology called thisAM, NAC protocol initiated, will follow toxicology recommendations  -check hepatitis panel  -PT/INR for fuctional liver fialure    #Abdominal pain  -the abdominal pain predates tylenol and still need to find causation of pain.  Pain is in RLQ radiating in groin on same side as right sided hydroureter.  -given side of pain, likely cause of pain.  -appreciate urology recs, once acute issue of acute hepatitis and tylenol toxicity resolves, will need to address this with urology, per their recs will consider gyn eval.     rest as above.

## 2018-12-04 NOTE — PROGRESS NOTE ADULT - PROBLEM SELECTOR PLAN 3
- Multifactorial given R hydronephrosis vs PUD given NSAID use vs hepatitis vs. fibroids  - Management/plan as above   (iSTOP Reference #: [id number] negative for Rx)  - ondansetron PRN nausea/vomiting  - simethicone PRN  - ibuprofen PRN for pain  - PPI given Hx gastritis and significant NSAID intake - Likely psychogenic vs Multifactorial (R hydronephrosis vs PUD given NSAID use vs hepatitis vs. fibroids)  - Management/plan as above   (iSTOP Reference #: [id number] negative for Rx)  - ondansetron PRN nausea/vomiting  - simethicone PRN  - ibuprofen PRN for pain  - PPI given Hx gastritis and significant NSAID intake  - c/w home Seroquel

## 2018-12-04 NOTE — PROGRESS NOTE ADULT - PROBLEM SELECTOR PLAN 4
Resolved. Pt reports severe pain and poor PO intake: likely orthostatic  - fall precautions  - EP consult for device interrogation: no events